# Patient Record
Sex: FEMALE | Race: WHITE | HISPANIC OR LATINO | Employment: UNEMPLOYED | URBAN - METROPOLITAN AREA
[De-identification: names, ages, dates, MRNs, and addresses within clinical notes are randomized per-mention and may not be internally consistent; named-entity substitution may affect disease eponyms.]

---

## 2017-03-30 ENCOUNTER — HOSPITAL ENCOUNTER (EMERGENCY)
Facility: HOSPITAL | Age: 42
Discharge: HOME | End: 2017-03-30
Attending: EMERGENCY MEDICINE | Admitting: EMERGENCY MEDICINE

## 2017-03-30 VITALS
WEIGHT: 175 LBS | TEMPERATURE: 97.3 F | DIASTOLIC BLOOD PRESSURE: 76 MMHG | RESPIRATION RATE: 16 BRPM | OXYGEN SATURATION: 100 % | HEART RATE: 75 BPM | SYSTOLIC BLOOD PRESSURE: 120 MMHG

## 2017-03-30 DIAGNOSIS — G44.209 HEADACHE, TENSION-TYPE: Primary | ICD-10-CM

## 2017-03-30 DIAGNOSIS — F43.9 STRESS: ICD-10-CM

## 2017-03-30 DIAGNOSIS — R07.89 ANTERIOR CHEST WALL PAIN: ICD-10-CM

## 2017-03-30 LAB — HCG UR QL: NEGATIVE

## 2017-03-30 PROCEDURE — 96375 TX/PRO/DX INJ NEW DRUG ADDON: CPT

## 2017-03-30 PROCEDURE — 93005 ELECTROCARDIOGRAM TRACING: CPT

## 2017-03-30 PROCEDURE — 96374 THER/PROPH/DIAG INJ IV PUSH: CPT

## 2017-03-30 PROCEDURE — 96361 HYDRATE IV INFUSION ADD-ON: CPT

## 2017-03-30 PROCEDURE — 99285 EMERGENCY DEPT VISIT HI MDM: CPT

## 2017-03-30 PROCEDURE — 81025 URINE PREGNANCY TEST: CPT | Performed by: EMERGENCY MEDICINE

## 2017-03-30 RX ORDER — LIDOCAINE 50 MG/G
1 PATCH TOPICAL ONCE
Status: DISCONTINUED | OUTPATIENT
Start: 2017-03-30 | End: 2017-03-31 | Stop reason: HOSPADM

## 2017-03-30 RX ORDER — NAPROXEN 375 MG/1
375 TABLET ORAL EVERY 8 HOURS PRN
Qty: 30 TABLET | Refills: 0 | Status: SHIPPED | OUTPATIENT
Start: 2017-03-30 | End: 2018-04-09

## 2017-03-30 RX ORDER — KETOROLAC TROMETHAMINE 30 MG/ML
15 INJECTION, SOLUTION INTRAMUSCULAR; INTRAVENOUS ONCE
Status: COMPLETED | OUTPATIENT
Start: 2017-03-30 | End: 2017-03-30

## 2017-03-30 RX ORDER — METOCLOPRAMIDE HYDROCHLORIDE 5 MG/ML
10 INJECTION INTRAMUSCULAR; INTRAVENOUS ONCE
Status: COMPLETED | OUTPATIENT
Start: 2017-03-30 | End: 2017-03-30

## 2017-03-30 RX ORDER — DIPHENHYDRAMINE HYDROCHLORIDE 50 MG/ML
25 INJECTION INTRAMUSCULAR; INTRAVENOUS ONCE
Status: COMPLETED | OUTPATIENT
Start: 2017-03-30 | End: 2017-03-30

## 2017-03-30 RX ORDER — BUTALBITAL, ACETAMINOPHEN AND CAFFEINE 50; 325; 40 MG/1; MG/1; MG/1
1 CAPSULE ORAL EVERY 6 HOURS PRN
Qty: 30 CAPSULE | Refills: 0 | Status: SHIPPED | OUTPATIENT
Start: 2017-03-30 | End: 2017-04-09

## 2017-03-30 RX ADMIN — LIDOCAINE 1 PATCH: 50 PATCH CUTANEOUS at 21:21

## 2017-03-30 RX ADMIN — DIPHENHYDRAMINE HYDROCHLORIDE 25 MG: 50 INJECTION, SOLUTION INTRAMUSCULAR; INTRAVENOUS at 21:22

## 2017-03-30 RX ADMIN — KETOROLAC TROMETHAMINE 15 MG: 30 INJECTION, SOLUTION INTRAMUSCULAR at 21:22

## 2017-03-30 RX ADMIN — METOCLOPRAMIDE 10 MG: 5 INJECTION, SOLUTION INTRAMUSCULAR; INTRAVENOUS at 21:22

## 2017-03-30 RX ADMIN — SODIUM CHLORIDE 1000 ML: 0.9 INJECTION, SOLUTION INTRAVENOUS at 21:21

## 2017-04-01 LAB
ATRIAL RATE: 67 BPM
P AXIS: 59 DEGREES
PR INTERVAL: 188 MS
QRS AXIS: 58 DEGREES
QRSD INTERVAL: 80 MS
QT INTERVAL: 378 MS
QTC INTERVAL: 399 MS
T WAVE AXIS: 55 DEGREES
VENTRICULAR RATE: 67 BPM

## 2017-10-18 ENCOUNTER — ALLSCRIPTS OFFICE VISIT (OUTPATIENT)
Dept: OTHER | Facility: OTHER | Age: 42
End: 2017-10-18

## 2017-10-19 NOTE — PROGRESS NOTES
Assessment  1  History of acute sinusitis (V12 69) (Z87 09)   2  Acute sinusitis (461 9) (J01 90)    Plan  Acute sinusitis    · Doxycycline Hyclate 100 MG Oral Capsule; TAKE 1 CAPSULE TWICE DAILY   · PredniSONE 10 MG Oral Tablet; TAKE 1 TABLET TWICE DAILY    Take medication as directed  Call if symptoms do not improve  Chief Complaint  1  Cold Symptoms  sinus pressure,headache x 4 days  taking tylenol      History of Present Illness  HPI: Patient is a 40-year-old female complaining of sinus pressure, headache, fever, chills, productive cough for almost a week  Symptoms are getting much worse over the last 2 days  She tried over-the-counter medication without relief  Cold Symptoms:   Associated symptoms include nasal congestion,-- runny nose,-- post nasal drainage,-- productive cough,-- facial pressure,-- facial pain,-- headache,-- fatigue,-- weakness,-- fever-- and-- chills, but-- no plugged ear(s),-- no ear pain,-- no swollen lymph nodes,-- no wheezing,-- no nausea,-- no vomiting-- and-- no diarrhea  Review of Systems    Constitutional: feeling poorly-- and-- feeling tired  ENT: as noted in HPI  Cardiovascular: no complaints of slow or fast heart rate, no chest pain, no palpitations, no leg claudication or lower extremity edema  Respiratory: as noted in HPI,-- no orthopnea,-- no shortness of breath during exertion-- and-- no PND  Genitourinary: no complaints of dysuria, no incontinence, no pelvic pain, no dysmenorrhea, no vaginal discharge or abnormal vaginal bleeding  Musculoskeletal: no arthralgias-- and-- no myalgias  Neurological: as noted in HPI  Active Problems  1  Carotid artery aneurysm (442 81) (I72 0)   2  Low back pain (724 2) (M54 5)    Past Medical History  1  History of Acute cystitis with hematuria (595 0) (N30 01)   2  History of Acute reaction to stress (308 9) (F43 0)   3  History of Atypical chest pain (786 59) (R07 89)   4  History of sciatica (V12 49) (Z86 69)   5  History of Impacted cerumen of left ear (380 4) (H61 22)   6  History of Insomnia due to stress (307 41) (F51 02)   7  History of Neck pain (723 1) (M54 2)   8  History of Somatic dysfunction of cervical region (739 1) (M99 01)    Family History  Mother    1  No pertinent family history    Social History   · Never A Smoker  The social history was reviewed and updated today  The social history was reviewed and is unchanged  Current Meds   1  Eszopiclone 1 MG Oral Tablet; take 1 tablet by mouth at bedtime for 5 NIGHTS, THEN   AS NEEDED; Therapy: [id number] to (Last Rx:[id number]) Ordered    The medication list was reviewed and updated today  Allergies  1  Amoxicillin CAPS    Vitals   Recorded: [id number] 02:39PM   Temperature 59 5 F   Systolic 583   Diastolic 70   Height 5 ft 8 in   Weight 172 lb    BMI Calculated 26 15   BSA Calculated 1 92     Physical Exam    Constitutional   General appearance: No acute distress, well appearing and well nourished  Eyes   Conjunctiva and lids: No swelling, erythema or discharge  Pupils and irises: Equal, round and reactive to light  Ears, Nose, Mouth, and Throat   External inspection of ears and nose: Normal     Otoscopic examination: Tympanic membranes translucent with normal light reflex  Canals patent without erythema  Nasal mucosa, septum, and turbinates: Abnormal   There was a mucoid discharge from both nares  The bilateral nasal mucosa was crusted-- and-- edematous  -- Right greater than left maxillary tenderness  Oropharynx: Normal with no erythema, edema, exudate or lesions  Pulmonary   Respiratory effort: No increased work of breathing or signs of respiratory distress  Auscultation of lungs: Clear to auscultation  Cardiovascular   Palpation of heart: Normal PMI, no thrills  Auscultation of heart: Normal rate and rhythm, normal S1 and S2, without murmurs      Examination of extremities for edema and/or varicosities: Normal     Carotid pulses: Normal     Abdomen   Abdomen: Non-tender, no masses  Liver and spleen: No hepatomegaly or splenomegaly  Lymphatic   Palpation of lymph nodes in neck: No lymphadenopathy  Musculoskeletal   Gait and station: Normal     Digits and nails: Normal without clubbing or cyanosis  Inspection/palpation of joints, bones, and muscles: Normal     Skin   Skin and subcutaneous tissue: Normal without rashes or lesions      Psychiatric   Orientation to person, place, and time: Normal     Mood and affect: Normal          Signatures   Electronically signed by : Janell Cuevas DO; Oct 18 2017  4:07PM EST                       (Author)

## 2018-01-12 VITALS
SYSTOLIC BLOOD PRESSURE: 110 MMHG | TEMPERATURE: 98.6 F | HEIGHT: 68 IN | BODY MASS INDEX: 26.07 KG/M2 | WEIGHT: 172 LBS | DIASTOLIC BLOOD PRESSURE: 70 MMHG

## 2018-04-09 ENCOUNTER — HOSPITAL ENCOUNTER (EMERGENCY)
Facility: HOSPITAL | Age: 43
Discharge: HOME | End: 2018-04-09
Attending: EMERGENCY MEDICINE | Admitting: EMERGENCY MEDICINE

## 2018-04-09 VITALS
RESPIRATION RATE: 16 BRPM | WEIGHT: 171 LBS | BODY MASS INDEX: 26 KG/M2 | DIASTOLIC BLOOD PRESSURE: 69 MMHG | SYSTOLIC BLOOD PRESSURE: 127 MMHG | TEMPERATURE: 97.8 F | HEART RATE: 74 BPM | OXYGEN SATURATION: 98 %

## 2018-04-09 DIAGNOSIS — R31.9 HEMATURIA: ICD-10-CM

## 2018-04-09 DIAGNOSIS — N30.00 ACUTE CYSTITIS: Primary | ICD-10-CM

## 2018-04-09 LAB
BACTERIA UR QL AUTO: ABNORMAL /HPF
BILIRUB UR QL STRIP: NEGATIVE
CLARITY UR: ABNORMAL
COLOR UR: YELLOW
EXT PREG TEST URINE: NEGATIVE
GLUCOSE UR STRIP-MCNC: NEGATIVE MG/DL
HGB UR QL STRIP.AUTO: ABNORMAL
KETONES UR STRIP-MCNC: NEGATIVE MG/DL
LEUKOCYTE ESTERASE UR QL STRIP: ABNORMAL
NITRITE UR QL STRIP: NEGATIVE
NON-SQ EPI CELLS URNS QL MICRO: ABNORMAL /HPF
PH UR STRIP.AUTO: 6.5 [PH] (ref 4.5–8)
PROT UR STRIP-MCNC: ABNORMAL MG/DL
RBC #/AREA URNS AUTO: ABNORMAL /HPF
SP GR UR STRIP.AUTO: 1.02 (ref 1–1.03)
UROBILINOGEN UR QL STRIP.AUTO: 0.2 E.U./DL
WBC #/AREA URNS AUTO: ABNORMAL /HPF

## 2018-04-09 PROCEDURE — 99283 EMERGENCY DEPT VISIT LOW MDM: CPT

## 2018-04-09 PROCEDURE — 81001 URINALYSIS AUTO W/SCOPE: CPT

## 2018-04-09 PROCEDURE — 87086 URINE CULTURE/COLONY COUNT: CPT

## 2018-04-09 PROCEDURE — 87186 SC STD MICRODIL/AGAR DIL: CPT

## 2018-04-09 PROCEDURE — 87077 CULTURE AEROBIC IDENTIFY: CPT

## 2018-04-09 PROCEDURE — 81025 URINE PREGNANCY TEST: CPT | Performed by: EMERGENCY MEDICINE

## 2018-04-09 PROCEDURE — 81002 URINALYSIS NONAUTO W/O SCOPE: CPT | Performed by: EMERGENCY MEDICINE

## 2018-04-09 RX ORDER — CEPHALEXIN 250 MG/1
500 CAPSULE ORAL ONCE
Status: COMPLETED | OUTPATIENT
Start: 2018-04-09 | End: 2018-04-09

## 2018-04-09 RX ORDER — CEPHALEXIN 500 MG/1
500 CAPSULE ORAL 2 TIMES DAILY
Qty: 6 CAPSULE | Refills: 0 | Status: SHIPPED | OUTPATIENT
Start: 2018-04-09 | End: 2018-04-12

## 2018-04-09 RX ORDER — IBUPROFEN 600 MG/1
600 TABLET ORAL ONCE
Status: COMPLETED | OUTPATIENT
Start: 2018-04-09 | End: 2018-04-09

## 2018-04-09 RX ORDER — PHENAZOPYRIDINE HYDROCHLORIDE 100 MG/1
100 TABLET, FILM COATED ORAL ONCE
Status: COMPLETED | OUTPATIENT
Start: 2018-04-09 | End: 2018-04-09

## 2018-04-09 RX ORDER — IBUPROFEN 600 MG/1
600 TABLET ORAL EVERY 6 HOURS PRN
Qty: 30 TABLET | Refills: 0 | Status: SHIPPED | OUTPATIENT
Start: 2018-04-09 | End: 2019-08-19

## 2018-04-09 RX ORDER — PHENAZOPYRIDINE HYDROCHLORIDE 200 MG/1
200 TABLET, FILM COATED ORAL 3 TIMES DAILY
Qty: 5 TABLET | Refills: 0 | Status: SHIPPED | OUTPATIENT
Start: 2018-04-09 | End: 2019-03-30 | Stop reason: ALTCHOICE

## 2018-04-09 RX ADMIN — CEPHALEXIN 500 MG: 250 CAPSULE ORAL at 10:37

## 2018-04-09 RX ADMIN — IBUPROFEN 600 MG: 600 TABLET ORAL at 10:04

## 2018-04-09 RX ADMIN — PHENAZOPYRIDINE HYDROCHLORIDE 100 MG: 100 TABLET ORAL at 10:04

## 2018-04-09 NOTE — DISCHARGE INSTRUCTIONS
Urinary Tract Infection in Women   WHAT YOU NEED TO KNOW:   A urinary tract infection (UTI) is caused by bacteria that get inside your urinary tract  Most bacteria that enter your urinary tract come out when you urinate  If the bacteria stay in your urinary tract, you may get an infection  Your urinary tract includes your kidneys, ureters, bladder, and urethra  Urine is made in your kidneys, and it flows from the ureters to the bladder  Urine leaves the bladder through the urethra  A UTI is more common in your lower urinary tract, which includes your bladder and urethra  DISCHARGE INSTRUCTIONS:   Return to the emergency department if:   · You are urinating very little or not at all  · You have a high fever with shaking chills  · You have side or back pain that gets worse  Contact your healthcare provider if:   · You have a fever  · You do not feel better after 2 days of taking antibiotics  · You are vomiting  · You have questions or concerns about your condition or care  Prevent another UTI:   · Empty your bladder often  Urinate and empty your bladder as soon as you feel the need  Do not hold your urine for long periods of time  · Wipe from front to back after you urinate or have a bowel movement  This will help prevent germs from getting into your urinary tract through your urethra  · Drink liquids as directed  Ask how much liquid to drink each day and which liquids are best for you  You may need to drink more liquids than usual to help flush out the bacteria  Do not drink alcohol, caffeine, or citrus juices  These can irritate your bladder and increase your symptoms  Your healthcare provider may recommend cranberry juice to help prevent a UTI  · Urinate after you have sex  This can help flush out bacteria passed during sex  · Do not douche or use feminine deodorants  These can change the chemical balance in your vagina  · Change sanitary pads or tampons often  This will help prevent germs from getting into your urinary tract

## 2018-04-11 LAB
BACTERIA UR CULT: ABNORMAL
BACTERIA UR CULT: ABNORMAL

## 2019-03-30 ENCOUNTER — APPOINTMENT (EMERGENCY)
Dept: RADIOLOGY | Facility: HOSPITAL | Age: 44
End: 2019-03-30

## 2019-03-30 ENCOUNTER — APPOINTMENT (EMERGENCY)
Dept: CT IMAGING | Facility: HOSPITAL | Age: 44
End: 2019-03-30

## 2019-03-30 ENCOUNTER — HOSPITAL ENCOUNTER (EMERGENCY)
Facility: HOSPITAL | Age: 44
Discharge: HOME | End: 2019-03-30
Attending: EMERGENCY MEDICINE

## 2019-03-30 VITALS
SYSTOLIC BLOOD PRESSURE: 123 MMHG | DIASTOLIC BLOOD PRESSURE: 71 MMHG | TEMPERATURE: 98.1 F | BODY MASS INDEX: 28.16 KG/M2 | OXYGEN SATURATION: 98 % | WEIGHT: 185.19 LBS | RESPIRATION RATE: 20 BRPM | HEART RATE: 75 BPM

## 2019-03-30 DIAGNOSIS — R07.89 ATYPICAL CHEST PAIN: ICD-10-CM

## 2019-03-30 DIAGNOSIS — R51.9 HEADACHE: Primary | ICD-10-CM

## 2019-03-30 DIAGNOSIS — B34.9 VIRAL ILLNESS: ICD-10-CM

## 2019-03-30 LAB
ALBUMIN SERPL BCP-MCNC: 3.5 G/DL (ref 3.5–5)
ALP SERPL-CCNC: 91 U/L (ref 46–116)
ALT SERPL W P-5'-P-CCNC: 24 U/L (ref 12–78)
ANION GAP SERPL CALCULATED.3IONS-SCNC: 8 MMOL/L (ref 4–13)
APTT PPP: 25 SECONDS (ref 26–38)
AST SERPL W P-5'-P-CCNC: 18 U/L (ref 5–45)
BASOPHILS # BLD AUTO: 0.04 THOUSANDS/ΜL (ref 0–0.1)
BASOPHILS NFR BLD AUTO: 1 % (ref 0–1)
BILIRUB SERPL-MCNC: 0.24 MG/DL (ref 0.2–1)
BILIRUB UR QL STRIP: NEGATIVE
BUN SERPL-MCNC: 12 MG/DL (ref 5–25)
CALCIUM SERPL-MCNC: 8.7 MG/DL (ref 8.3–10.1)
CHLORIDE SERPL-SCNC: 105 MMOL/L (ref 100–108)
CLARITY UR: CLEAR
CO2 SERPL-SCNC: 29 MMOL/L (ref 21–32)
COLOR UR: YELLOW
CREAT SERPL-MCNC: 0.72 MG/DL (ref 0.6–1.3)
EOSINOPHIL # BLD AUTO: 0.15 THOUSAND/ΜL (ref 0–0.61)
EOSINOPHIL NFR BLD AUTO: 3 % (ref 0–6)
ERYTHROCYTE [DISTWIDTH] IN BLOOD BY AUTOMATED COUNT: 12.6 % (ref 11.6–15.1)
EXT PREG TEST URINE: NEGATIVE
GFR SERPL CREATININE-BSD FRML MDRD: 101 ML/MIN/1.73SQ M
GLUCOSE SERPL-MCNC: 97 MG/DL (ref 65–140)
GLUCOSE UR STRIP-MCNC: NEGATIVE MG/DL
HCT VFR BLD AUTO: 39.6 % (ref 34.8–46.1)
HGB BLD-MCNC: 12.7 G/DL (ref 11.5–15.4)
HGB UR QL STRIP.AUTO: NEGATIVE
IMM GRANULOCYTES # BLD AUTO: 0.02 THOUSAND/UL (ref 0–0.2)
IMM GRANULOCYTES NFR BLD AUTO: 0 % (ref 0–2)
INR PPP: 0.97 (ref 0.86–1.17)
KETONES UR STRIP-MCNC: ABNORMAL MG/DL
LEUKOCYTE ESTERASE UR QL STRIP: NEGATIVE
LYMPHOCYTES # BLD AUTO: 1.41 THOUSANDS/ΜL (ref 0.6–4.47)
LYMPHOCYTES NFR BLD AUTO: 28 % (ref 14–44)
MAGNESIUM SERPL-MCNC: 1.7 MG/DL (ref 1.6–2.6)
MCH RBC QN AUTO: 27.8 PG (ref 26.8–34.3)
MCHC RBC AUTO-ENTMCNC: 32.1 G/DL (ref 31.4–37.4)
MCV RBC AUTO: 87 FL (ref 82–98)
MONOCYTES # BLD AUTO: 0.38 THOUSAND/ΜL (ref 0.17–1.22)
MONOCYTES NFR BLD AUTO: 8 % (ref 4–12)
NEUTROPHILS # BLD AUTO: 3.01 THOUSANDS/ΜL (ref 1.85–7.62)
NEUTS SEG NFR BLD AUTO: 60 % (ref 43–75)
NITRITE UR QL STRIP: NEGATIVE
NRBC BLD AUTO-RTO: 0 /100 WBCS
PH UR STRIP.AUTO: 7 [PH] (ref 4.5–8)
PLATELET # BLD AUTO: 203 THOUSANDS/UL (ref 149–390)
PMV BLD AUTO: 11.4 FL (ref 8.9–12.7)
POTASSIUM SERPL-SCNC: 3.4 MMOL/L (ref 3.5–5.3)
PROT SERPL-MCNC: 7 G/DL (ref 6.4–8.2)
PROT UR STRIP-MCNC: NEGATIVE MG/DL
PROTHROMBIN TIME: 13 SECONDS (ref 11.8–14.2)
RBC # BLD AUTO: 4.57 MILLION/UL (ref 3.81–5.12)
SODIUM SERPL-SCNC: 142 MMOL/L (ref 136–145)
SP GR UR STRIP.AUTO: 1.02 (ref 1–1.03)
TROPONIN I SERPL-MCNC: <0.02 NG/ML
UROBILINOGEN UR QL STRIP.AUTO: 1 E.U./DL
WBC # BLD AUTO: 5.01 THOUSAND/UL (ref 4.31–10.16)

## 2019-03-30 PROCEDURE — 71046 X-RAY EXAM CHEST 2 VIEWS: CPT

## 2019-03-30 PROCEDURE — 85025 COMPLETE CBC W/AUTO DIFF WBC: CPT | Performed by: NURSE PRACTITIONER

## 2019-03-30 PROCEDURE — 96375 TX/PRO/DX INJ NEW DRUG ADDON: CPT

## 2019-03-30 PROCEDURE — 93005 ELECTROCARDIOGRAM TRACING: CPT

## 2019-03-30 PROCEDURE — 81003 URINALYSIS AUTO W/O SCOPE: CPT

## 2019-03-30 PROCEDURE — 83735 ASSAY OF MAGNESIUM: CPT | Performed by: NURSE PRACTITIONER

## 2019-03-30 PROCEDURE — 36415 COLL VENOUS BLD VENIPUNCTURE: CPT | Performed by: NURSE PRACTITIONER

## 2019-03-30 PROCEDURE — 99284 EMERGENCY DEPT VISIT MOD MDM: CPT

## 2019-03-30 PROCEDURE — 85730 THROMBOPLASTIN TIME PARTIAL: CPT | Performed by: NURSE PRACTITIONER

## 2019-03-30 PROCEDURE — 70450 CT HEAD/BRAIN W/O DYE: CPT

## 2019-03-30 PROCEDURE — 81025 URINE PREGNANCY TEST: CPT

## 2019-03-30 PROCEDURE — 96361 HYDRATE IV INFUSION ADD-ON: CPT

## 2019-03-30 PROCEDURE — 96365 THER/PROPH/DIAG IV INF INIT: CPT

## 2019-03-30 PROCEDURE — 80053 COMPREHEN METABOLIC PANEL: CPT | Performed by: NURSE PRACTITIONER

## 2019-03-30 PROCEDURE — 85610 PROTHROMBIN TIME: CPT | Performed by: NURSE PRACTITIONER

## 2019-03-30 PROCEDURE — 84484 ASSAY OF TROPONIN QUANT: CPT | Performed by: NURSE PRACTITIONER

## 2019-03-30 RX ORDER — MAGNESIUM SULFATE HEPTAHYDRATE 40 MG/ML
2 INJECTION, SOLUTION INTRAVENOUS ONCE
Status: COMPLETED | OUTPATIENT
Start: 2019-03-30 | End: 2019-03-30

## 2019-03-30 RX ORDER — METHYLPREDNISOLONE SODIUM SUCCINATE 125 MG/2ML
80 INJECTION, POWDER, LYOPHILIZED, FOR SOLUTION INTRAMUSCULAR; INTRAVENOUS ONCE
Status: COMPLETED | OUTPATIENT
Start: 2019-03-30 | End: 2019-03-30

## 2019-03-30 RX ORDER — METOCLOPRAMIDE HYDROCHLORIDE 5 MG/ML
10 INJECTION INTRAMUSCULAR; INTRAVENOUS ONCE
Status: COMPLETED | OUTPATIENT
Start: 2019-03-30 | End: 2019-03-30

## 2019-03-30 RX ORDER — KETOROLAC TROMETHAMINE 30 MG/ML
30 INJECTION, SOLUTION INTRAMUSCULAR; INTRAVENOUS ONCE
Status: COMPLETED | OUTPATIENT
Start: 2019-03-30 | End: 2019-03-30

## 2019-03-30 RX ORDER — DIPHENHYDRAMINE HYDROCHLORIDE 50 MG/ML
25 INJECTION INTRAMUSCULAR; INTRAVENOUS ONCE
Status: COMPLETED | OUTPATIENT
Start: 2019-03-30 | End: 2019-03-30

## 2019-03-30 RX ADMIN — METOCLOPRAMIDE 10 MG: 5 INJECTION, SOLUTION INTRAMUSCULAR; INTRAVENOUS at 20:42

## 2019-03-30 RX ADMIN — KETOROLAC TROMETHAMINE 30 MG: 30 INJECTION, SOLUTION INTRAMUSCULAR; INTRAVENOUS at 20:38

## 2019-03-30 RX ADMIN — METHYLPREDNISOLONE SODIUM SUCCINATE 80 MG: 125 INJECTION, POWDER, FOR SOLUTION INTRAMUSCULAR; INTRAVENOUS at 20:41

## 2019-03-30 RX ADMIN — MAGNESIUM SULFATE HEPTAHYDRATE 2 G: 40 INJECTION, SOLUTION INTRAVENOUS at 21:16

## 2019-03-30 RX ADMIN — SODIUM CHLORIDE 1000 ML: 0.9 INJECTION, SOLUTION INTRAVENOUS at 20:37

## 2019-03-30 RX ADMIN — DIPHENHYDRAMINE HYDROCHLORIDE 25 MG: 50 INJECTION, SOLUTION INTRAMUSCULAR; INTRAVENOUS at 20:39

## 2019-04-01 LAB
ATRIAL RATE: 72 BPM
P AXIS: 106 DEGREES
PR INTERVAL: 196 MS
QRS AXIS: 65 DEGREES
QRSD INTERVAL: 70 MS
QT INTERVAL: 378 MS
QTC INTERVAL: 413 MS
T WAVE AXIS: 63 DEGREES
VENTRICULAR RATE: 72 BPM

## 2019-04-01 PROCEDURE — 93010 ELECTROCARDIOGRAM REPORT: CPT | Performed by: INTERNAL MEDICINE

## 2019-04-02 ENCOUNTER — OFFICE VISIT (OUTPATIENT)
Dept: FAMILY MEDICINE CLINIC | Facility: CLINIC | Age: 44
End: 2019-04-02

## 2019-04-02 VITALS
SYSTOLIC BLOOD PRESSURE: 120 MMHG | WEIGHT: 186 LBS | HEIGHT: 67 IN | DIASTOLIC BLOOD PRESSURE: 78 MMHG | BODY MASS INDEX: 29.19 KG/M2

## 2019-04-02 DIAGNOSIS — G43.009 MIGRAINE WITHOUT AURA AND WITHOUT STATUS MIGRAINOSUS, NOT INTRACTABLE: Primary | ICD-10-CM

## 2019-04-02 DIAGNOSIS — Z23 NEED FOR VACCINATION: ICD-10-CM

## 2019-04-02 DIAGNOSIS — M62.838 CERVICAL PARASPINAL MUSCLE SPASM: ICD-10-CM

## 2019-04-02 PROCEDURE — 99213 OFFICE O/P EST LOW 20 MIN: CPT | Performed by: FAMILY MEDICINE

## 2019-04-02 RX ORDER — TIZANIDINE HYDROCHLORIDE 4 MG/1
CAPSULE, GELATIN COATED ORAL
Qty: 10 CAPSULE | Refills: 0 | Status: SHIPPED | OUTPATIENT
Start: 2019-04-02 | End: 2019-08-19

## 2019-04-02 RX ORDER — BUTALBITAL, ACETAMINOPHEN AND CAFFEINE 50; 325; 40 MG/1; MG/1; MG/1
1 TABLET ORAL EVERY 4 HOURS PRN
Qty: 30 TABLET | Refills: 0 | Status: SHIPPED | OUTPATIENT
Start: 2019-04-02 | End: 2019-08-19

## 2019-08-19 ENCOUNTER — OFFICE VISIT (OUTPATIENT)
Dept: FAMILY MEDICINE CLINIC | Facility: CLINIC | Age: 44
End: 2019-08-19

## 2019-08-19 VITALS
TEMPERATURE: 98.6 F | DIASTOLIC BLOOD PRESSURE: 82 MMHG | WEIGHT: 186 LBS | SYSTOLIC BLOOD PRESSURE: 120 MMHG | HEIGHT: 67 IN | BODY MASS INDEX: 29.19 KG/M2

## 2019-08-19 DIAGNOSIS — H61.22 IMPACTED CERUMEN OF LEFT EAR: ICD-10-CM

## 2019-08-19 DIAGNOSIS — H60.392 ACUTE INFECTIVE OTITIS EXTERNA, LEFT: Primary | ICD-10-CM

## 2019-08-19 PROBLEM — M62.838 CERVICAL PARASPINAL MUSCLE SPASM: Status: RESOLVED | Noted: 2019-04-02 | Resolved: 2019-08-19

## 2019-08-19 PROCEDURE — 99213 OFFICE O/P EST LOW 20 MIN: CPT | Performed by: FAMILY MEDICINE

## 2019-08-19 NOTE — PROGRESS NOTES
Assessment/Plan:         Diagnoses and all orders for this visit:    Acute infective otitis externa, left  Comments:   call if symptoms do not improve  Orders:  -     neomycin-polymyxin-hydrocortisone (CORTISPORIN) otic solution; Administer 4 drops into the left ear every 6 (six) hours    Impacted cerumen of left ear  Comments:    Cerumen removed without difficulty  May use hydrogen peroxide once a month were event further difficulties  Return to the office if symptoms return  Subjective:   Chief Complaint   Patient presents with    Earache      x 1 wk   Neck Pain      x  2 days           Patient ID: Lucía John is a 53 y o  female  Patient is a 56-year-old female complaining of left ear pain for a week  It got much worse over the last 48 hours  She has hearing loss in that ear  She has had trouble with wax in that ear in the past  She denies any cold symptoms  She has been swimming quite a bit this summer  The following portions of the patient's history were reviewed and updated as appropriate: allergies, current medications, past family history, past medical history, past social history, past surgical history and problem list     Review of Systems   Constitutional: Negative for activity change, chills, diaphoresis, fatigue, fever and unexpected weight change  HENT: Positive for ear pain and hearing loss  Negative for congestion, nosebleeds, postnasal drip, rhinorrhea, sinus pressure, sore throat and tinnitus  Eyes: Negative for photophobia, pain, discharge, redness and visual disturbance  Respiratory: Negative for cough, chest tightness, shortness of breath and wheezing  Cardiovascular: Negative for chest pain, palpitations and leg swelling  Denies GONGORA   Gastrointestinal: Negative for abdominal pain, blood in stool, constipation, diarrhea, nausea and vomiting  Endocrine: Negative      Genitourinary: Negative for difficulty urinating, dysuria, frequency, hematuria and urgency  Denies dysmenorrhea, menstrual difficulties   Musculoskeletal: Negative for arthralgias, joint swelling and myalgias  Skin: Negative for rash and wound  Denies skin lesions, change in birthmarks   Allergic/Immunologic: Negative for environmental allergies, food allergies and immunocompromised state  Neurological: Negative for dizziness, tremors, seizures, syncope, weakness, numbness and headaches  Hematological: Negative  Psychiatric/Behavioral: Negative for behavioral problems, confusion, decreased concentration and sleep disturbance  The patient is not nervous/anxious  Objective:      /82   Temp 98 6 °F (37 °C)   Ht 5' 7" (1 702 m)   Wt 84 4 kg (186 lb)   BMI 29 13 kg/m²          Physical Exam   Constitutional: She is oriented to person, place, and time  She appears well-developed and well-nourished  No distress  HENT:   Head: Normocephalic and atraumatic  Right Ear: External ear normal    Left Ear: External ear normal  Left ear middle ear effusion:     Nose: Nose normal    Mouth/Throat: Oropharynx is clear and moist      Cerumen impaction OS  Once the wax was removed there was inflammation of the canal with some purulent material    Eyes: Pupils are equal, round, and reactive to light  Conjunctivae and EOM are normal    Neck: Normal range of motion  Neck supple  No JVD present  No tracheal deviation present  No thyromegaly present  Cardiovascular: Normal rate, regular rhythm and normal heart sounds  No murmur heard  Pulmonary/Chest: Effort normal and breath sounds normal  She has no wheezes  She has no rales  Abdominal: Soft  Bowel sounds are normal  She exhibits no mass  There is no tenderness  There is no rebound and no guarding  Musculoskeletal: Normal range of motion  She exhibits no edema or tenderness  Lymphadenopathy:     She has no cervical adenopathy  Neurological: She is alert and oriented to person, place, and time  She has normal reflexes  No cranial nerve deficit  Skin: Skin is warm and dry  No lesion and no rash noted  Psychiatric: She has a normal mood and affect  Judgment normal        Procedure- soft brown wax was removed easily using a cerumen loop  It was mildly uncomfortable to the patient but this was done atraumatically there was no bleeding or swelling after the wax was removed

## 2019-09-12 ENCOUNTER — OFFICE VISIT (OUTPATIENT)
Dept: FAMILY MEDICINE CLINIC | Facility: CLINIC | Age: 44
End: 2019-09-12

## 2019-09-12 VITALS
BODY MASS INDEX: 29.66 KG/M2 | HEIGHT: 67 IN | DIASTOLIC BLOOD PRESSURE: 82 MMHG | WEIGHT: 189 LBS | SYSTOLIC BLOOD PRESSURE: 120 MMHG

## 2019-09-12 DIAGNOSIS — I72.0 ANEURYSM OF LEFT CAROTID ARTERY (HCC): ICD-10-CM

## 2019-09-12 DIAGNOSIS — M54.2 NECK PAIN ON LEFT SIDE: Primary | ICD-10-CM

## 2019-09-12 DIAGNOSIS — M54.12 CERVICAL RADICULOPATHY: ICD-10-CM

## 2019-09-12 PROCEDURE — 99214 OFFICE O/P EST MOD 30 MIN: CPT | Performed by: FAMILY MEDICINE

## 2019-09-12 RX ORDER — PREDNISONE 10 MG/1
TABLET ORAL
Qty: 30 TABLET | Refills: 0 | Status: SHIPPED | OUTPATIENT
Start: 2019-09-12 | End: 2019-10-08 | Stop reason: ALTCHOICE

## 2019-09-12 RX ORDER — BACLOFEN 10 MG/1
10 TABLET ORAL 3 TIMES DAILY
Qty: 30 TABLET | Refills: 0 | Status: SHIPPED | OUTPATIENT
Start: 2019-09-12 | End: 2019-10-08 | Stop reason: SDUPTHER

## 2019-09-12 NOTE — PROGRESS NOTES
Assessment/Plan:         Diagnoses and all orders for this visit:    Neck pain on left side  -     predniSONE 10 mg tablet; 5 x 2 days, 4 x 2 days, 3 x 2 days,2 x 2 days, 1 x 2 days  -     baclofen 10 mg tablet; Take 1 tablet (10 mg total) by mouth 3 (three) times a day  -     XR spine cervical complete 4 or 5 vw non injury; Future  -     Ambulatory Referral to Emergency Medicine; Future    Cervical radiculopathy  -     predniSONE 10 mg tablet; 5 x 2 days, 4 x 2 days, 3 x 2 days,2 x 2 days, 1 x 2 days  -     baclofen 10 mg tablet; Take 1 tablet (10 mg total) by mouth 3 (three) times a day  -     XR spine cervical complete 4 or 5 vw non injury; Future    Aneurysm of left carotid artery [facility])  Comments:   will refer to the emergency department  Orders:  -     Ambulatory Referral to Emergency Medicine; Future          Subjective:   Chief Complaint   Patient presents with    Neck Pain      x 4-5 days           Patient ID: Shraddha Case is a 53 y o  female  Patient is a 68-year-old female complaining of  Left-sided neck pain radiating into her left arm with numbness and weakness  Has had pain in her neck for a month and had a real bad migraine over the weekend  She denies prior similar episodes  She has had multiple mild traumatic episodes with her neck over her lifetime  She does have numbness and tingling going into her hand and down her arm  Of note, she had emergency room visit back in 2015, imaging at that time was suggestive of a possible left carotid aneurysm  It was recommended she see Neurosurgery  She did not follow up at that time  The following portions of the patient's history were reviewed and updated as appropriate: allergies, current medications, past family history, past medical history, past social history, past surgical history and problem list     Review of Systems   Constitutional: Negative for activity change, chills, diaphoresis, fatigue, fever and unexpected weight change  HENT: Negative for congestion, ear pain, hearing loss, nosebleeds, postnasal drip, rhinorrhea, sinus pressure, sore throat and tinnitus  Eyes: Negative for photophobia, pain, discharge, redness and visual disturbance  Respiratory: Negative for cough, chest tightness, shortness of breath and wheezing  Cardiovascular: Negative for chest pain, palpitations and leg swelling  Denies GONGORA   Gastrointestinal: Negative for abdominal pain, blood in stool, constipation, diarrhea, nausea and vomiting  Endocrine: Negative  Genitourinary: Negative for difficulty urinating, dysuria, frequency, hematuria and urgency  Denies dysmenorrhea, menstrual difficulties   Musculoskeletal: Positive for neck pain and neck stiffness  Negative for arthralgias, joint swelling and myalgias  Skin: Negative for rash and wound  Denies skin lesions, change in birthmarks   Allergic/Immunologic: Negative for environmental allergies, food allergies and immunocompromised state  Neurological: Positive for weakness and numbness  Negative for dizziness, tremors, seizures, syncope and headaches  Hematological: Negative  Psychiatric/Behavioral: Negative for behavioral problems, confusion, decreased concentration and sleep disturbance  The patient is not nervous/anxious  Objective:      /82   Ht 5' 7" (1 702 m)   Wt 85 7 kg (189 lb)   BMI 29 60 kg/m²          Physical Exam   Constitutional: She is oriented to person, place, and time  She appears well-developed and well-nourished  No distress  HENT:   Head: Normocephalic and atraumatic  Right Ear: External ear normal    Left Ear: External ear normal    Nose: Nose normal    Mouth/Throat: Oropharynx is clear and moist    Eyes: Pupils are equal, round, and reactive to light  Conjunctivae and EOM are normal    Neck: Normal range of motion  Neck supple  No JVD present  No tracheal deviation present  No thyromegaly present     Cardiovascular: Normal rate, regular rhythm and normal heart sounds  No murmur heard  Pulmonary/Chest: Effort normal and breath sounds normal  She has no wheezes  She has no rales  Abdominal: Soft  Bowel sounds are normal  She exhibits no mass  There is no tenderness  There is no rebound and no guarding  Musculoskeletal: She exhibits no edema  Cervical back: She exhibits decreased range of motion and tenderness (  Left lateral)  She exhibits no swelling  Lymphadenopathy:     She has no cervical adenopathy  Neurological: She is alert and oriented to person, place, and time  She has normal strength and normal reflexes  No cranial nerve deficit  Decreased sensation to light touch of the left hand, lateral forearm, and upper arm   Skin: Skin is warm and dry  No lesion and no rash noted  Psychiatric: She has a normal mood and affect   Judgment normal

## 2019-10-08 ENCOUNTER — OFFICE VISIT (OUTPATIENT)
Dept: FAMILY MEDICINE CLINIC | Facility: CLINIC | Age: 44
End: 2019-10-08

## 2019-10-08 VITALS
DIASTOLIC BLOOD PRESSURE: 78 MMHG | WEIGHT: 189 LBS | BODY MASS INDEX: 29.66 KG/M2 | HEIGHT: 67 IN | SYSTOLIC BLOOD PRESSURE: 118 MMHG

## 2019-10-08 DIAGNOSIS — M54.41 ACUTE MIDLINE LOW BACK PAIN WITH RIGHT-SIDED SCIATICA: Primary | ICD-10-CM

## 2019-10-08 DIAGNOSIS — R82.90 ABNORMAL URINE ODOR: ICD-10-CM

## 2019-10-08 DIAGNOSIS — M54.12 CERVICAL RADICULOPATHY: ICD-10-CM

## 2019-10-08 DIAGNOSIS — M54.2 NECK PAIN ON LEFT SIDE: ICD-10-CM

## 2019-10-08 LAB
SL AMB  POCT GLUCOSE, UA: NEGATIVE
SL AMB LEUKOCYTE ESTERASE,UA: ABNORMAL
SL AMB POCT BILIRUBIN,UA: NEGATIVE
SL AMB POCT BLOOD,UA: ABNORMAL
SL AMB POCT CLARITY,UA: CLEAR
SL AMB POCT COLOR,UA: YELLOW
SL AMB POCT KETONES,UA: NEGATIVE
SL AMB POCT NITRITE,UA: NEGATIVE
SL AMB POCT PH,UA: 6
SL AMB POCT SPECIFIC GRAVITY,UA: 1
SL AMB POCT URINE PROTEIN: NEGATIVE
SL AMB POCT UROBILINOGEN: 0.2

## 2019-10-08 PROCEDURE — 99213 OFFICE O/P EST LOW 20 MIN: CPT | Performed by: FAMILY MEDICINE

## 2019-10-08 PROCEDURE — 81002 URINALYSIS NONAUTO W/O SCOPE: CPT | Performed by: FAMILY MEDICINE

## 2019-10-08 RX ORDER — CIPROFLOXACIN 500 MG/1
500 TABLET, FILM COATED ORAL EVERY 12 HOURS SCHEDULED
Qty: 10 TABLET | Refills: 0 | Status: SHIPPED | OUTPATIENT
Start: 2019-10-08 | End: 2019-10-13

## 2019-10-08 RX ORDER — BACLOFEN 10 MG/1
TABLET ORAL
Qty: 30 TABLET | Refills: 0 | Status: SHIPPED | OUTPATIENT
Start: 2019-10-08 | End: 2020-04-16 | Stop reason: SDUPTHER

## 2019-10-08 RX ORDER — NAPROXEN 500 MG/1
TABLET ORAL
Qty: 30 TABLET | Refills: 0 | Status: SHIPPED | OUTPATIENT
Start: 2019-10-08 | End: 2020-02-25 | Stop reason: SDUPTHER

## 2019-10-08 NOTE — ASSESSMENT & PLAN NOTE
Back exercises were given Patient to thae the anti inflammatories and the muscle relaxant as directed

## 2019-10-08 NOTE — PATIENT INSTRUCTIONS
Patient appears to have 2 different issues She has low back pain that radiates to the right side and buttock She also has pelvic pain and odor to her urine Her urine dipstick shows there is small amount of blood and white blood cells which is indicative of a urinary tract infection I will treat that with 5 days of cipro I will send out her urine to confirm infection Patient will also make an appointment with her GYN Patient for the low back pain will take the naprosyn 500mg tiwce daily for 10 days then as needed with the muscle relaxant every 8 hours as needed She will also do the low back exercises     Core Strengthening Exercises   WHAT YOU NEED TO KNOW:   What do I need to know about core strengthening exercises? Core strengthening exercises help heal and strengthen muscles of your hips, back, and abdomen to prevent reinjury  They are beginning exercises to help support your spine  Ask your healthcare provider if you need to see a physical therapist for more advanced exercises  · Do the exercises on a mat or firm surface  (not on a bed) to support your spine and avoid low back pain  · Do the exercises in the same order every time  to train your muscles to work together  Your healthcare provider will show you how to perform these exercises  Do them every day, or as directed by your healthcare provider  · Move slowly and smoothly  Avoid fast or jerky motions  · Stop if you feel pain  It is normal to feel some discomfort at first  Regular exercise will help decrease your discomfort over time  How do I perform core strengthening exercises safely? Hold each exercise for 5 seconds  When you can do the exercise without pain for 5 seconds, increase your hold to 10 to 15 seconds  When you can do the exercise without pain for 10 to 15 seconds, add the next exercise  Increase the time you hold each exercise, or repeat the exercises as directed  As you do each exercise, breathe normally   Do not hold your breath  · Abdominal bracing:  Lie on your back with your knees bent and feet flat on the floor  Place your arms in a relaxed position beside your body  Pull your belly button in toward your spine  Do not flatten or arch your back  Tighten the abdominal muscles below your belly button  Hold for 5 seconds  Begin all of your exercises with abdominal bracing  You can also practice abdominal bracing throughout the day while you are sitting or standing  · Bridging:  Lie on your back with your knees bent and feet flat on the floor  Rest your arms at your side  Tighten your buttocks, and then lift your hips 1 inch off the floor  Hold for 5 seconds  When you can do this exercise without pain for 10 seconds, increase the distance you lift your hips  A good goal is to be able to lift your hips so that your shoulders, hips, and knees are in a straight line  · Curl up:  Lie on your back with your knees bent and feet flat on the floor  Place your hands, palms down, underneath the curve in your lower back  Next, with your elbows on the floor, lift your shoulders and chest 2 to 3 inches  Keep your head in line with your shoulders  Hold this position for 5 seconds  When you can do this exercise without pain for 10 to 15 seconds, you may add a rotation  While your shoulders and chest are lifted off the ground, turn slightly to the left and hold  Repeat on the other side  · Dead bug:  Lie on your back with your knees bent and feet flat on the floor  Place your arms in a relaxed position beside your body  Begin with abdominal bracing  Next, raise one leg, keeping your knee bent  Hold for 5 seconds  Repeat with the other leg  When you can do this exercise without pain for 10 to 15 seconds, you may raise one straight leg and hold  Repeat with the other leg  · Quadruped:  Place your hands and knees on the floor  Keep your wrists directly below your shoulders and your knees directly below your hips  Pull your belly button in toward your spine  Do not flatten or arch your back  Tighten your abdominal muscles below your belly button  Hold for 5 seconds  When you can do this exercise without pain for 10 to 15 seconds, you may extend one arm and hold  Repeat on the other side  · Side Bridge:      ¨ Standing side bridge:  Stand next to a wall and extend one arm toward the wall  Place your palm flat on the wall with your fingers pointing upward  Begin with abdominal bracing  Next, without moving your feet, slowly bend your arm to 90 degrees  Hold for 5 seconds  Repeat on the other side  When you can do this exercise without pain for 10 to 15 seconds, you may do the bent leg side bridge on the floor  ¨ Bent leg side bridge:  Lie on one side with your legs, hips, and shoulders in a straight line  Prop yourself up onto your forearm so your elbow is directly below your shoulder  Bend your knees back to 90 degrees  Begin with abdominal bracing  Next, lift your hips and balance yourself on your forearm and knees  Hold for 5 seconds  Repeat on the other side  When you can do this exercise without pain for 10 to 15 seconds, you may do the straight leg side bridge on the floor  ¨ Straight leg side bridge:  Lie on one side with your legs, hips, and shoulders in a straight line  Prop yourself up onto your forearm so your elbow is directly below your shoulder  Begin with abdominal bracing  Lift your hips off the floor and balance yourself on your forearm and the outside of your flexed foot  Do not let your ankle bend sideways  Hold for 5 seconds  Repeat on the other side  When you can do this exercise without pain for 10 to 15 seconds, ask your healthcare provider for more advanced exercises  When should I contact my healthcare provider? · Your pain becomes worse  · You have new pain      · You have questions or concerns about your condition, care, or exercise program   CARE AGREEMENT: You have the right to help plan your care  Learn about your health condition and how it may be treated  Discuss treatment options with your caregivers to decide what care you want to receive  You always have the right to refuse treatment  The above information is an  only  It is not intended as medical advice for individual conditions or treatments  Talk to your doctor, nurse or pharmacist before following any medical regimen to see if it is safe and effective for you  © 2016 [address] [employer] is for End User's use only and may not be sold, redistributed or otherwise used for commercial purposes  All illustrations and images included in CareNotes® are the copyrighted property of [employer]  or [employer]  Acute Low Back Pain   AMBULATORY CARE:   Acute low back pain  is sudden discomfort in your lower back area that lasts for up to 6 weeks  The discomfort makes it difficult to tolerate activity  Common symptoms include the following:   · Back stiffness or spasms    · Pain down the back or side of one leg    · Holding yourself in an unusual position or posture to decrease your back pain    · Not being able to find a sitting position that is comfortable    · Slow increase in your pain for 24 to 48 hours after you stress your back    · Tenderness on your lower back or severe pain when you move your back  Seek immediate care for the following symptoms:   · Severe pain    · Sudden stiffness and heaviness in both buttocks down to both legs    · Numbness or weakness in one leg, or pain in both legs    · Numbness in your genital area or across your lower back    · Unable to control your urine or bowel movements  Contact your healthcare provider if:   · You have a fever  · You have pain at night or when you rest     · Your pain does not get better with treatment  · You have pain that worsens when you cough or sneeze      · You suddenly feel something pop or snap in your back  · You have questions or concerns about your condition or care  The goal of treatment for acute low back pain  is to relieve your pain and help you tolerate activity  Most people with acute lower back pain get better within 4 to 6 weeks  You may need any of the following:  · Acetaminophen  decreases pain  It is available without a doctor's order  Ask how much to take and how often to take it  Follow directions  Acetaminophen can cause liver damage if not taken correctly  · NSAIDs  help decrease swelling and pain  This medicine is available with or without a doctor's order  NSAIDs can cause stomach bleeding or kidney problems in certain people  If you take blood thinner medicine, always ask your healthcare provider if NSAIDs are safe for you  Always read the medicine label and follow directions  · Prescription pain medicine  may be given  Ask your healthcare provider how to take this medicine safely  · Muscle relaxers  decrease pain by relaxing the muscles in your lower spine  Manage your symptoms:   · Stay active  as much as you can without causing more pain  Bed rest could make your back pain worse  Start with some light exercises such as walking  Avoid heavy lifting until your pain is gone  Ask for more information about the activities or exercises that are right for you  · Ice  helps decrease swelling, pain, and muscle spams  Put crushed ice in a plastic bag  Cover it with a towel  Place it on your lower back for 20 to 30 minutes every 2 hours  Do this for about 2 to 3 days after your pain starts, or as directed  · Heat  helps decrease pain and muscle spasms  Start to use heat after treatment with ice has stopped  Use a small towel dampened with warm water or a heating pad, or sit in a warm bath  Apply heat on the area for 20 to 30 minutes every 2 hours for as many days as directed  Alternate heat and ice  Prevent acute low back pain:   · Use proper body mechanics  ¨ Bend at the hips and knees when you  objects  Do not bend from the waist  Use your leg muscles as you lift the load  Do not use your back  Keep the object close to your chest as you lift it  Try not to twist or lift anything above your waist     ¨ Change your position often when you stand for long periods of time  Rest one foot on a small box or footrest, and then switch to the other foot often  ¨ Try not to sit for long periods of time  When you do, sit in a straight-backed chair with your feet flat on the floor  Never reach, pull, or push while you are sitting  · Do exercises that strengthen your back muscles  Warm up before you exercise  Ask your healthcare provider the best exercises for you  · Maintain a healthy weight  Ask your healthcare provider how much you should weigh  Ask him to help you create a weight loss plan if you are overweight  Follow up with your healthcare provider as directed:  Return for a follow-up visit if you still have pain after 1 to 3 weeks of treatment  You may need to visit an orthopedist if your back pain lasts more than 12 weeks  Write down your questions so you remember to ask them during your visits  © 2017 [address] [employer] Information is for End User's use only and may not be sold, redistributed or otherwise used for commercial purposes  All illustrations and images included in CareNotes® are the copyrighted property of [employer]  or [employer]ane [employer]  The above information is an  only  It is not intended as medical advice for individual conditions or treatments  Talk to your doctor, nurse or pharmacist before following any medical regimen to see if it is safe and effective for you

## 2019-10-08 NOTE — PROGRESS NOTES
Assessment/Plan:    Acute midline low back pain with right-sided sciatica  Back exercises were given Patient to thae the anti inflammatories and the muscle relaxant as directed     Abnormal urine odor  UA shows white cells and red blood cells with the odor thus will treat with the cipro as directed and patient will also see her GYN       Diagnoses and all orders for this visit:    Acute midline low back pain with right-sided sciatica  -     naproxen (NAPROSYN) 500 mg tablet; 1 tablet twice daily with food for 10 days then as needed for pain    Abnormal urine odor  -     ciprofloxacin (CIPRO) 500 mg tablet; Take 1 tablet (500 mg total) by mouth every 12 (twelve) hours for 5 days  -     Urine culture  -     POCT urine dip    Neck pain on left side  -     baclofen 10 mg tablet; 1 tablet every 8 hours as needed for lumbar pain and spasm    Cervical radiculopathy  -     baclofen 10 mg tablet; 1 tablet every 8 hours as needed for lumbar pain and spasm          Subjective:   Chief Complaint   Patient presents with    Back Pain      x 4-5 days           Patient ID: Azul Cool is a 53 y o  female  Patient is here for low back pain that began on Saturday when she was trying to get out of bed Patient notes pain is mostly on the right side and does radiate to the right buttock and leg Patient also complains of pelvic pain and pressure She just finished her cycle Patient has had low back pain before Patient also notes an odor to her urine for last few days Patient stats tat she has had this kind of pain with UTI but with that generally has burning with urination She has no buringin with urination Her period ended last week on Thursday Patient has no weakness in the legs Patient has been taking tylenol and advil with some relief Patient has no incontinence with this    Back Pain   This is a recurrent problem  The current episode started in the past 7 days  The problem occurs constantly  The problem is unchanged   The pain is present in the lumbar spine and sacro-iliac  The quality of the pain is described as cramping  The pain radiates to the right thigh  The pain is at a severity of 6/10  The pain is moderate  The pain is the same all the time  The symptoms are aggravated by bending  Stiffness is present all day  Associated symptoms include pelvic pain  Pertinent negatives include no abdominal pain, bladder incontinence, bowel incontinence, chest pain, dysuria, fever, headaches, leg pain, numbness, paresis, paresthesias, perianal numbness, tingling, weakness or weight loss  She has tried analgesics for the symptoms  The treatment provided mild relief  The following portions of the patient's history were reviewed and updated as appropriate: allergies, current medications, past medical history, past social history and problem list     Review of Systems   Constitutional: Negative for fever and weight loss  Cardiovascular: Negative for chest pain  Gastrointestinal: Negative for abdominal pain and bowel incontinence  Genitourinary: Positive for pelvic pain  Negative for bladder incontinence, decreased urine volume, dysuria, flank pain and frequency  Odor to the urine   Musculoskeletal: Positive for back pain  Neurological: Negative for tingling, weakness, numbness, headaches and paresthesias  Objective:      /78   Ht 5' 7" (1 702 m)   Wt 85 7 kg (189 lb)   BMI 29 60 kg/m²          Physical Exam   Constitutional: She appears well-developed and well-nourished  HENT:   Head: Normocephalic and atraumatic  Right Ear: External ear normal    Left Ear: External ear normal    Eyes: Pupils are equal, round, and reactive to light  EOM are normal    Cardiovascular: Normal rate and regular rhythm  Pulmonary/Chest: Effort normal    Abdominal: Soft     Supra pubic pain   Musculoskeletal:   Pain to palpation of the right PSIS Patient had flexion to 45 and extension to 0 and her side bending and rotaiton is to 40 Patient gait is normal Patent has normal strength in both legs There is some paraspinal muscle tightness b/l lumbar area   Neurological: She is alert  Nursing note and vitals reviewed

## 2019-10-08 NOTE — ASSESSMENT & PLAN NOTE
UA shows white cells and red blood cells with the odor thus will treat with the cipro as directed and patient will also see her GYN

## 2019-10-16 ENCOUNTER — OFFICE VISIT (OUTPATIENT)
Dept: OBGYN CLINIC | Facility: CLINIC | Age: 44
End: 2019-10-16

## 2019-10-16 VITALS
SYSTOLIC BLOOD PRESSURE: 133 MMHG | WEIGHT: 191.2 LBS | BODY MASS INDEX: 29.95 KG/M2 | DIASTOLIC BLOOD PRESSURE: 89 MMHG | HEART RATE: 86 BPM

## 2019-10-16 DIAGNOSIS — N76.0 BACTERIAL VAGINOSIS: Primary | ICD-10-CM

## 2019-10-16 DIAGNOSIS — R10.2 PELVIC PAIN: ICD-10-CM

## 2019-10-16 DIAGNOSIS — B96.89 BACTERIAL VAGINOSIS: Primary | ICD-10-CM

## 2019-10-16 DIAGNOSIS — M54.41 ACUTE MIDLINE LOW BACK PAIN WITH RIGHT-SIDED SCIATICA: ICD-10-CM

## 2019-10-16 PROBLEM — M54.50 ACUTE MIDLINE LOW BACK PAIN: Status: ACTIVE | Noted: 2019-10-16

## 2019-10-16 LAB
BV WHIFF TEST VAG QL: POSITIVE
CLUE CELLS SPEC QL WET PREP: POSITIVE
PH SMN: 5.5 [PH]
SL AMB POCT WET MOUNT: ABNORMAL
T VAGINALIS VAG QL WET PREP: NEGATIVE
YEAST VAG QL WET PREP: NEGATIVE

## 2019-10-16 PROCEDURE — 99202 OFFICE O/P NEW SF 15 MIN: CPT | Performed by: NURSE PRACTITIONER

## 2019-10-16 PROCEDURE — 87210 SMEAR WET MOUNT SALINE/INK: CPT | Performed by: NURSE PRACTITIONER

## 2019-10-16 RX ORDER — METRONIDAZOLE 500 MG/1
500 TABLET ORAL EVERY 12 HOURS SCHEDULED
Qty: 14 TABLET | Refills: 0 | Status: SHIPPED | OUTPATIENT
Start: 2019-10-16 | End: 2019-10-23

## 2019-10-16 NOTE — PATIENT INSTRUCTIONS
Use Metronidazole as directed  Make appointment for pelvic ultrasound  Keep appointment for Annual GYN exam and PAP      We are Moving!!  Dear Friends and Patients, We are excited to announce that we will be moving to a new nearby location in early December, 2019! The new address is:    [address], [address] [address]    Please be on the lookout for additional information as it becomes available  Thank you for trusting us with your care and we look forward to continuing to serve you in the future

## 2020-01-28 ENCOUNTER — ANNUAL EXAM (OUTPATIENT)
Dept: OBGYN CLINIC | Facility: CLINIC | Age: 45
End: 2020-01-28

## 2020-01-28 VITALS
SYSTOLIC BLOOD PRESSURE: 129 MMHG | HEART RATE: 79 BPM | BODY MASS INDEX: 29.98 KG/M2 | DIASTOLIC BLOOD PRESSURE: 84 MMHG | HEIGHT: 67 IN | WEIGHT: 191 LBS

## 2020-01-28 DIAGNOSIS — Z12.4 SCREENING FOR CERVICAL CANCER: ICD-10-CM

## 2020-01-28 DIAGNOSIS — Z12.31 ENCOUNTER FOR SCREENING MAMMOGRAM FOR MALIGNANT NEOPLASM OF BREAST: ICD-10-CM

## 2020-01-28 DIAGNOSIS — Z01.419 ENCOUNTER FOR ANNUAL ROUTINE GYNECOLOGICAL EXAMINATION: Primary | ICD-10-CM

## 2020-01-28 PROCEDURE — 99396 PREV VISIT EST AGE 40-64: CPT | Performed by: NURSE PRACTITIONER

## 2020-01-28 PROCEDURE — G0145 SCR C/V CYTO,THINLAYER,RESCR: HCPCS | Performed by: NURSE PRACTITIONER

## 2020-01-28 PROCEDURE — 87624 HPV HI-RISK TYP POOLED RSLT: CPT | Performed by: NURSE PRACTITIONER

## 2020-01-28 NOTE — PATIENT INSTRUCTIONS
PAP results will be available in 2 weeks  Make [facility] appointment  Make appointment for pelvic ultrasound previously  Ordered  Make appointment to discuss fertility and ultrasound results  Call with needs or concerns

## 2020-01-28 NOTE — LETTER
2020    To Mansi TORRES: 1975      This letter is to advise you that your recent PAP SMEAR results were reviewed by me and are NORMAL    We will see you in 1 year for your annual exam     Daniel Hansen

## 2020-01-28 NOTE — PROGRESS NOTES
Annual Exam    Assessment   1  Encounter for annual routine gynecological examination     2  Encounter for screening mammogram for malignant neoplasm of breast  Mammo screening bilateral w cad   3  Screening for cervical cancer  Liquid-based pap, screening     well woman       Plan       All questions answered  Breast self exam technique reviewed and patient encouraged to perform self-exam monthly  Contraception: none  Discussed healthy lifestyle modifications  Follow up in 1 year  Thin prep Pap smear  Patient Instructions   PAP results will be available in 2 weeks  Make [facility] appointment  Make appointment for pelvic ultrasound previously  Ordered  Make appointment to discuss fertility and ultrasound results  Call with needs or concerns   Pt verbalized understanding of all discussed  Subjective      Daryle Maple is a 53 y o  No obstetric history on file  female who presents for annual well woman exam  Periods are regular every 28-30 days, lasting 10 days  States some periods are shorter but she only has 1 period per month  No intermenstrual bleeding, spotting, or discharge  Pt states she still has intermittent pelvic pain, now has insurance and plans to have pelvic U/S done  Pt is interested in a 6th child, aware in increased genetic risk and that it may be more difficult to conceive  Pt states she took medication to become pregnant with her 6and 5year old    States she is unsure when last PAP was done but states all  all have been WNL    Current contraception: none  History of abnormal Pap smear: no  Family history of uterine or ovarian cancer: no  Regular self breast exam: yes  History of abnormal mammogram: no  Family history of breast cancer: no  History of abnormal lipids: unknown  Menstrual History:  OB History        6    Para   6    Term   5       1    AB        Living   5       SAB        TAB        Ectopic        Multiple        Live Births   11                Menarche age: 10  Patient's last menstrual period was 01/15/2020  The following portions of the patient's history were reviewed and updated as appropriate: allergies, current medications, past family history, past medical history, past social history, past surgical history and problem list     Review of Systems  Pertinent items are noted in HPI        Objective      /84   Pulse 79   Ht 5' 7" (1 702 m)   Wt 86 6 kg (191 lb)   LMP 01/15/2020   BMI 29 91 kg/m²     General: alert and oriented, in no acute distress, alert, appears stated age and cooperative   Heart: regular rate and rhythm, S1, S2 normal, no murmur, click, rub or gallop   Lungs: clear to auscultation bilaterally   Thyroid: Negative masses   Abdomen: soft, non-tender, without masses or organomegaly   Vulva: normal   Vagina: normal mucosa   Cervix: no cervical motion tenderness and no lesions   Uterus: normal size, non-tender, normal shape and consistency   Adnexa: normal adnexa   Urethra: normal   Breasts: NT,negative masses, discharge, or dimpling

## 2020-01-29 LAB
HPV HR 12 DNA CVX QL NAA+PROBE: NEGATIVE
HPV16 DNA CVX QL NAA+PROBE: NEGATIVE
HPV18 DNA CVX QL NAA+PROBE: NEGATIVE

## 2020-01-30 ENCOUNTER — HOSPITAL ENCOUNTER (OUTPATIENT)
Dept: MAMMOGRAPHY | Facility: MEDICAL CENTER | Age: 45
Discharge: HOME | End: 2020-01-30
Payer: COMMERCIAL

## 2020-01-30 VITALS — WEIGHT: 191 LBS | HEIGHT: 67 IN | BODY MASS INDEX: 29.98 KG/M2

## 2020-01-30 DIAGNOSIS — Z12.31 ENCOUNTER FOR SCREENING MAMMOGRAM FOR MALIGNANT NEOPLASM OF BREAST: ICD-10-CM

## 2020-01-30 LAB
LAB AP GYN PRIMARY INTERPRETATION: NORMAL
Lab: NORMAL

## 2020-01-30 PROCEDURE — 77067 SCR MAMMO BI INCL CAD: CPT

## 2020-02-25 DIAGNOSIS — M54.41 ACUTE MIDLINE LOW BACK PAIN WITH RIGHT-SIDED SCIATICA: ICD-10-CM

## 2020-02-25 RX ORDER — NAPROXEN 500 MG/1
TABLET ORAL
Qty: 30 TABLET | Refills: 0 | Status: SHIPPED | OUTPATIENT
Start: 2020-02-25 | End: 2020-03-16

## 2020-03-03 DIAGNOSIS — R10.2 PELVIC PAIN: Primary | ICD-10-CM

## 2020-03-14 DIAGNOSIS — M54.41 ACUTE MIDLINE LOW BACK PAIN WITH RIGHT-SIDED SCIATICA: ICD-10-CM

## 2020-03-16 RX ORDER — NAPROXEN 500 MG/1
TABLET ORAL
Qty: 30 TABLET | Refills: 0 | Status: SHIPPED | OUTPATIENT
Start: 2020-03-16 | End: 2020-04-17

## 2020-04-15 ENCOUNTER — TELEPHONE (OUTPATIENT)
Dept: FAMILY MEDICINE CLINIC | Facility: CLINIC | Age: 45
End: 2020-04-15

## 2020-04-16 DIAGNOSIS — M54.12 CERVICAL RADICULOPATHY: ICD-10-CM

## 2020-04-16 DIAGNOSIS — M54.2 NECK PAIN ON LEFT SIDE: ICD-10-CM

## 2020-04-16 RX ORDER — BACLOFEN 10 MG/1
TABLET ORAL
Qty: 30 TABLET | Refills: 0 | Status: SHIPPED | OUTPATIENT
Start: 2020-04-16 | End: 2020-11-16

## 2020-04-17 DIAGNOSIS — M54.41 ACUTE MIDLINE LOW BACK PAIN WITH RIGHT-SIDED SCIATICA: ICD-10-CM

## 2020-04-17 RX ORDER — NAPROXEN 500 MG/1
TABLET ORAL
Qty: 30 TABLET | Refills: 0 | Status: SHIPPED | OUTPATIENT
Start: 2020-04-17 | End: 2020-05-11

## 2020-05-09 DIAGNOSIS — M54.41 ACUTE MIDLINE LOW BACK PAIN WITH RIGHT-SIDED SCIATICA: ICD-10-CM

## 2020-05-11 RX ORDER — NAPROXEN 500 MG/1
TABLET ORAL
Qty: 30 TABLET | Refills: 0 | Status: SHIPPED | OUTPATIENT
Start: 2020-05-11 | End: 2020-06-08

## 2020-05-18 ENCOUNTER — OFFICE VISIT (OUTPATIENT)
Dept: FAMILY MEDICINE CLINIC | Facility: CLINIC | Age: 45
End: 2020-05-18
Payer: COMMERCIAL

## 2020-05-18 VITALS
WEIGHT: 194 LBS | TEMPERATURE: 98 F | BODY MASS INDEX: 30.45 KG/M2 | HEIGHT: 67 IN | DIASTOLIC BLOOD PRESSURE: 84 MMHG | SYSTOLIC BLOOD PRESSURE: 132 MMHG

## 2020-05-18 DIAGNOSIS — M54.41 CHRONIC RIGHT-SIDED LOW BACK PAIN WITH RIGHT-SIDED SCIATICA: Primary | ICD-10-CM

## 2020-05-18 DIAGNOSIS — G89.29 CHRONIC RIGHT-SIDED LOW BACK PAIN WITH RIGHT-SIDED SCIATICA: Primary | ICD-10-CM

## 2020-05-18 DIAGNOSIS — M77.11 RIGHT LATERAL EPICONDYLITIS: ICD-10-CM

## 2020-05-18 PROCEDURE — 3008F BODY MASS INDEX DOCD: CPT | Performed by: FAMILY MEDICINE

## 2020-05-18 PROCEDURE — 1036F TOBACCO NON-USER: CPT | Performed by: FAMILY MEDICINE

## 2020-05-18 PROCEDURE — 99214 OFFICE O/P EST MOD 30 MIN: CPT | Performed by: FAMILY MEDICINE

## 2020-05-18 PROCEDURE — 96372 THER/PROPH/DIAG INJ SC/IM: CPT | Performed by: FAMILY MEDICINE

## 2020-05-18 PROCEDURE — 3008F BODY MASS INDEX DOCD: CPT | Performed by: NURSE PRACTITIONER

## 2020-05-18 RX ORDER — PREDNISONE 10 MG/1
TABLET ORAL
Qty: 18 TABLET | Refills: 0 | Status: SHIPPED | OUTPATIENT
Start: 2020-05-18 | End: 2020-11-16 | Stop reason: ALTCHOICE

## 2020-05-18 RX ORDER — KETOROLAC TROMETHAMINE 30 MG/ML
30 INJECTION, SOLUTION INTRAMUSCULAR; INTRAVENOUS ONCE
Status: COMPLETED | OUTPATIENT
Start: 2020-05-18 | End: 2020-05-18

## 2020-05-18 RX ADMIN — KETOROLAC TROMETHAMINE 30 MG: 30 INJECTION, SOLUTION INTRAMUSCULAR; INTRAVENOUS at 16:13

## 2020-06-08 DIAGNOSIS — M54.41 ACUTE MIDLINE LOW BACK PAIN WITH RIGHT-SIDED SCIATICA: ICD-10-CM

## 2020-06-08 RX ORDER — NAPROXEN 500 MG/1
TABLET ORAL
Qty: 30 TABLET | Refills: 0 | Status: SHIPPED | OUTPATIENT
Start: 2020-06-08 | End: 2021-09-16

## 2020-11-15 ENCOUNTER — NURSE TRIAGE (OUTPATIENT)
Dept: OTHER | Facility: OTHER | Age: 45
End: 2020-11-15

## 2020-11-16 ENCOUNTER — TELEPHONE (OUTPATIENT)
Dept: FAMILY MEDICINE CLINIC | Facility: CLINIC | Age: 45
End: 2020-11-16

## 2020-11-16 DIAGNOSIS — M54.41 CHRONIC RIGHT-SIDED LOW BACK PAIN WITH RIGHT-SIDED SCIATICA: Primary | ICD-10-CM

## 2020-11-16 DIAGNOSIS — G89.29 CHRONIC RIGHT-SIDED LOW BACK PAIN WITH RIGHT-SIDED SCIATICA: Primary | ICD-10-CM

## 2020-11-16 RX ORDER — PREDNISONE 10 MG/1
TABLET ORAL
Qty: 30 TABLET | Refills: 0 | Status: SHIPPED | OUTPATIENT
Start: 2020-11-16 | End: 2021-09-16

## 2020-11-16 RX ORDER — TIZANIDINE HYDROCHLORIDE 4 MG/1
4 CAPSULE, GELATIN COATED ORAL 3 TIMES DAILY PRN
Qty: 30 CAPSULE | Refills: 0 | Status: SHIPPED | OUTPATIENT
Start: 2020-11-16 | End: 2021-09-16

## 2021-01-15 ENCOUNTER — OFFICE VISIT (OUTPATIENT)
Dept: FAMILY MEDICINE CLINIC | Facility: CLINIC | Age: 46
End: 2021-01-15

## 2021-01-15 VITALS
WEIGHT: 193 LBS | BODY MASS INDEX: 30.29 KG/M2 | HEIGHT: 67 IN | SYSTOLIC BLOOD PRESSURE: 110 MMHG | DIASTOLIC BLOOD PRESSURE: 70 MMHG | TEMPERATURE: 95.9 F

## 2021-01-15 DIAGNOSIS — S05.01XA ABRASION OF RIGHT CORNEA, INITIAL ENCOUNTER: Primary | ICD-10-CM

## 2021-01-15 DIAGNOSIS — M26.621 TMJ TENDERNESS, RIGHT: ICD-10-CM

## 2021-01-15 PROBLEM — B96.89 BACTERIAL VAGINOSIS: Status: RESOLVED | Noted: 2019-10-16 | Resolved: 2021-01-15

## 2021-01-15 PROBLEM — N76.0 BACTERIAL VAGINOSIS: Status: RESOLVED | Noted: 2019-10-16 | Resolved: 2021-01-15

## 2021-01-15 PROCEDURE — 99213 OFFICE O/P EST LOW 20 MIN: CPT | Performed by: FAMILY MEDICINE

## 2021-01-15 RX ORDER — TRAMADOL HYDROCHLORIDE 50 MG/1
50 TABLET ORAL EVERY 6 HOURS PRN
Qty: 10 TABLET | Refills: 0 | Status: SHIPPED | OUTPATIENT
Start: 2021-01-15 | End: 2021-09-16

## 2021-01-15 RX ORDER — POLYMYXIN B SULFATE AND TRIMETHOPRIM 1; 10000 MG/ML; [USP'U]/ML
1 SOLUTION OPHTHALMIC EVERY 6 HOURS
Qty: 10 ML | Refills: 0 | Status: SHIPPED | OUTPATIENT
Start: 2021-01-15 | End: 2021-09-16

## 2021-01-15 NOTE — PROGRESS NOTES
Assessment/Plan:    Right corneal abrasion  Patient to use the eye drops as directed and take the pain medication    TMJ tenderness, right  Stop chewing gum and warm compresses Patient to followup with the dentist        Diagnoses and all orders for this visit:    Abrasion of right cornea, initial encounter  -     traMADol (ULTRAM) 50 mg tablet; Take 1 tablet (50 mg total) by mouth every 6 (six) hours as needed for moderate pain or severe pain  -     polymyxin b-trimethoprim (POLYTRIM) ophthalmic solution; Administer 1 drop to the right eye every 6 (six) hours    TMJ tenderness, right          Subjective:   Chief Complaint   Patient presents with    Earache     right ear-"started this am"    Eye Problem     right eye-"last night-it feels like something is in my eye"        Patient ID: Esau Collet is a 50 y o  female  Patient is here with right ear pain today Patient has right eye with redness and and itching Patient felt she got a hair stuck in it last night Patient flushed the eye with saline Since then there is pain and itching       The following portions of the patient's history were reviewed and updated as appropriate: allergies, current medications, past family history, past medical history, past social history, past surgical history and problem list     Review of Systems   Constitutional: Negative for fatigue and fever  HENT: Positive for ear pain  Right ear and right jaw pain   Eyes: Positive for photophobia, pain and itching  Negative for discharge, redness and visual disturbance  Objective:      /70   Temp (!) 95 9 °F (35 5 °C)   Ht 5' 7" (1 702 m)   Wt 87 5 kg (193 lb)   BMI 30 23 kg/m²          Physical Exam  Constitutional:       Appearance: Normal appearance  HENT:      Head: Normocephalic        Right Ear: Tympanic membrane normal       Left Ear: Tympanic membrane normal       Ears:      Comments: Pain at the angle of the right jaw with crepitance  Eyes: General:         Right eye: Discharge present  Left eye: No discharge  Extraocular Movements: Extraocular movements intact  Pupils: Pupils are equal, round, and reactive to light  Comments: Using wood's lamp there is an area of uptake under the right pupil   Neurological:      Mental Status: She is alert

## 2021-01-15 NOTE — PATIENT INSTRUCTIONS
Corneal Abrasion   AMBULATORY CARE:   A corneal abrasion  is a scratch on the cornea of your eye  The cornea is the clear layer that covers the front of your eye  A small scratch may heal in 1 to 2 days  Deeper or larger scratches may take longer to heal  Do not touch or rub your eye while it is healing  Common signs and symptoms:   · Pain    · More tears than usual    · Redness    · A feeling that you have something in your eye    · Blurred vision    · Sensitivity to bright light    · Headache    Call your healthcare provider or ophthalmologist if:   · Your eye pain or vision gets worse  · You have yellow or green drainage from your eye  · You have questions or concerns about your condition or care  Treatment for a corneal abrasion  may include antibiotic eyedrops or ointment to help prevent an eye infection  You may also be given eyedrops to decrease pain  Care for your eyes:   · Get regular eye exams  Get your eyes checked at least every year  · Eat healthy foods  Fresh fruits and vegetables that are rich in vitamins A and C may help with your vision  Foods such as sweet potatoes, apricots, and carrots are rich in good nutrients for the eyes  · Take care of your contacts or glasses  Store, clean, and use your contacts or glasses as directed  Replace your glasses or contact lenses as often as your healthcare provider suggests  · Decrease eye strain  Rest your eyes, especially after you read or sew for long periods of time  Get plenty of sleep at night  Use lights that reduce glare in your home, school, or workplace  · Wear dark sunglasses  This will help prevent pain and light sensitivity  Make sure the sunglasses have UVA and UVB protection  This will protect your eyes when you go outside  · Use eyedrops safely  If your treatment plan includes eyedrops, it is important to use them as directed  Your provider may give you detailed instructions to follow   The eyedrops may also come with safety instructions  Follow all instructions to help prevent an infection  Do not touch the tip of the bottle to your eye  Germs from your eye can spread to the medicine bottle  Prevent corneal abrasions:   · Remove your contact lenses if your eyes feel dry or irritated  · Wash your hands if you need to touch your eyes or your face  · Trim your child's fingernails so he or she cannot scratch his or her eye  · Wear protective eyewear when you work with chemicals, wood, dust, or metal     · Wear protective eyewear when you play sports  · Do not wear your contacts for longer than you should  · Do not wear colored lenses or lenses with shapes on them  These lenses may cause eye damage and vision loss  · Do not wear glitter makeup  Glitter can easily get into your eyes and under contact lenses  · Do not sleep with your contacts in  Follow up with your healthcare provider as directed:  Write down your questions so you remember to ask them during your visits  © Copyright [address] Information is for End User's use only and may not be sold, redistributed or otherwise used for commercial purposes  All illustrations and images included in CareNotes® are the copyrighted property of [employer]  or [address] [place], [place] [address]alivia Hampton   The above information is an  only  It is not intended as medical advice for individual conditions or treatments  Talk to your doctor, nurse or pharmacist before following any medical regimen to see if it is safe and effective for you

## 2021-09-16 ENCOUNTER — OFFICE VISIT (OUTPATIENT)
Dept: FAMILY MEDICINE CLINIC | Facility: CLINIC | Age: 46
End: 2021-09-16

## 2021-09-16 VITALS
SYSTOLIC BLOOD PRESSURE: 118 MMHG | TEMPERATURE: 97.9 F | BODY MASS INDEX: 30.61 KG/M2 | HEIGHT: 67 IN | WEIGHT: 195 LBS | DIASTOLIC BLOOD PRESSURE: 80 MMHG

## 2021-09-16 DIAGNOSIS — M72.2 PLANTAR FASCIITIS OF LEFT FOOT: Primary | ICD-10-CM

## 2021-09-16 DIAGNOSIS — I83.892 VARICOSE VEINS OF LEFT LEG WITH EDEMA: ICD-10-CM

## 2021-09-16 PROCEDURE — 99212 OFFICE O/P EST SF 10 MIN: CPT | Performed by: FAMILY MEDICINE

## 2021-09-16 RX ORDER — PREDNISONE 10 MG/1
TABLET ORAL
Qty: 30 TABLET | Refills: 0 | Status: SHIPPED | OUTPATIENT
Start: 2021-09-16 | End: 2021-12-03

## 2021-09-16 NOTE — PROGRESS NOTES
Assessment/Plan:    No problem-specific Assessment & Plan notes found for this encounter  Diagnoses and all orders for this visit:    Plantar fasciitis of left foot  Comments:   prednisone and ice at the in the day  Reinforce the need for good supportive footwear  Orders:  -     predniSONE 10 mg tablet; 5 x 2 days, 4 x 2 days, 3 x 2 days,2 x 2 days, 1 x 2 days  Varicose veins of left leg with edema  Comments:    Recommend support stockings  Subjective:   Chief Complaint   Patient presents with    Pain     left heel,calf           Patient ID: Vanna Valadez is a 48 y o  female  She is complaining of pain in her left foot mostly along the inner arch and into the heel  She is also complaining of some swelling and discomfort in her left calf  States disease both have been going on for over a couple weeks  She does not remember any particular trauma  The calf has been going on for a bit longer actually  She is on her feet a lot, she wears sandals mostly during the day  The following portions of the patient's history were reviewed and updated as appropriate: allergies, current medications, past family history, past medical history, past social history, past surgical history and problem list     Review of Systems   Constitutional: Negative for chills and fever  HENT: Negative for ear pain and sore throat  Eyes: Negative for pain and visual disturbance  Respiratory: Negative for cough and shortness of breath  Cardiovascular: Positive for leg swelling  Negative for chest pain and palpitations  Gastrointestinal: Negative for abdominal pain and vomiting  Genitourinary: Negative for dysuria and hematuria  Musculoskeletal: Positive for arthralgias and gait problem  Negative for back pain  Skin: Negative for color change and rash  Neurological: Negative for seizures and syncope  Hematological: Does not bruise/bleed easily  All other systems reviewed and are negative  Objective:      /80   Temp 97 9 °F (36 6 °C)   Ht 5' 7" (1 702 m)   Wt 88 5 kg (195 lb)   BMI 30 54 kg/m²          Physical Exam  Vitals and nursing note reviewed  Constitutional:       General: She is not in acute distress  Appearance: She is well-developed  HENT:      Head: Normocephalic and atraumatic  Right Ear: Tympanic membrane, ear canal and external ear normal       Left Ear: Tympanic membrane, ear canal and external ear normal       Nose: Nose normal    Eyes:      Conjunctiva/sclera: Conjunctivae normal       Pupils: Pupils are equal, round, and reactive to light  Neck:      Thyroid: No thyromegaly  Vascular: No JVD  Trachea: No tracheal deviation  Cardiovascular:      Rate and Rhythm: Normal rate and regular rhythm  Heart sounds: Normal heart sounds  No murmur heard  Comments: Varicosities bilaterally, nonpitting edema bilaterally  Pulmonary:      Effort: Pulmonary effort is normal       Breath sounds: Normal breath sounds  No wheezing or rales  Abdominal:      General: Bowel sounds are normal       Palpations: Abdomen is soft  There is no mass  Tenderness: There is no abdominal tenderness  There is no guarding or rebound  Musculoskeletal:         General: Normal range of motion  Cervical back: Normal range of motion and neck supple  Left foot: Swelling and tenderness (  Along the medial arch) present  Comments:  No calcaneal tenderness  Lymphadenopathy:      Cervical: No cervical adenopathy  Skin:     General: Skin is warm and dry  Findings: No lesion or rash  Neurological:      Mental Status: She is alert and oriented to person, place, and time  Cranial Nerves: No cranial nerve deficit  Deep Tendon Reflexes: Reflexes are normal and symmetric     Psychiatric:         Judgment: Judgment normal

## 2021-12-02 ENCOUNTER — APPOINTMENT (EMERGENCY)
Dept: RADIOLOGY | Facility: HOSPITAL | Age: 46
End: 2021-12-02
Payer: COMMERCIAL

## 2021-12-02 ENCOUNTER — APPOINTMENT (EMERGENCY)
Dept: CT IMAGING | Facility: HOSPITAL | Age: 46
End: 2021-12-02
Payer: COMMERCIAL

## 2021-12-02 ENCOUNTER — HOSPITAL ENCOUNTER (EMERGENCY)
Facility: HOSPITAL | Age: 46
Discharge: HOME | End: 2021-12-02
Attending: EMERGENCY MEDICINE | Admitting: EMERGENCY MEDICINE
Payer: COMMERCIAL

## 2021-12-02 VITALS
RESPIRATION RATE: 18 BRPM | HEART RATE: 72 BPM | BODY MASS INDEX: 31.77 KG/M2 | WEIGHT: 202.82 LBS | OXYGEN SATURATION: 98 % | SYSTOLIC BLOOD PRESSURE: 132 MMHG | DIASTOLIC BLOOD PRESSURE: 97 MMHG | TEMPERATURE: 97.6 F

## 2021-12-02 DIAGNOSIS — R51.9 HEADACHE: ICD-10-CM
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
## 2021-12-02 DIAGNOSIS — S16.1XXA ACUTE STRAIN OF NECK MUSCLE, INITIAL ENCOUNTER: ICD-10-CM

## 2021-12-02 DIAGNOSIS — M47.812 DJD (DEGENERATIVE JOINT DISEASE) OF CERVICAL SPINE: ICD-10-CM

## 2021-12-02 DIAGNOSIS — S09.90XA CLOSED HEAD INJURY, INITIAL ENCOUNTER: ICD-10-CM

## 2021-12-02 PROCEDURE — 70450 CT HEAD/BRAIN W/O DYE: CPT

## 2021-12-02 PROCEDURE — 99284 EMERGENCY DEPT VISIT MOD MDM: CPT

## 2021-12-02 PROCEDURE — 99284 EMERGENCY DEPT VISIT MOD MDM: CPT | Performed by: PHYSICIAN ASSISTANT

## 2021-12-02 PROCEDURE — 72125 CT NECK SPINE W/O DYE: CPT

## 2021-12-02 PROCEDURE — 73000 X-RAY EXAM OF COLLAR BONE: CPT

## 2021-12-02 RX ORDER — METHOCARBAMOL 500 MG/1
500 TABLET, FILM COATED ORAL 4 TIMES DAILY
Qty: 40 TABLET | Refills: 0 | Status: SHIPPED | OUTPATIENT
Start: 2021-12-02 | End: 2021-12-21 | Stop reason: ALTCHOICE

## 2021-12-02 RX ORDER — NAPROXEN 500 MG/1
500 TABLET ORAL 2 TIMES DAILY WITH MEALS
Qty: 20 TABLET | Refills: 0 | Status: SHIPPED | OUTPATIENT
Start: 2021-12-02 | End: 2022-02-28

## 2021-12-02 RX ORDER — ACETAMINOPHEN 325 MG/1
975 TABLET ORAL ONCE AS NEEDED
Status: COMPLETED | OUTPATIENT
Start: 2021-12-02 | End: 2021-12-02

## 2021-12-02 RX ADMIN — ACETAMINOPHEN 975 MG: 325 TABLET, FILM COATED ORAL at 12:48

## 2021-12-03 ENCOUNTER — TELEPHONE (OUTPATIENT)
Dept: PHYSICAL THERAPY | Facility: OTHER | Age: 46
End: 2021-12-03

## 2021-12-03 ENCOUNTER — OFFICE VISIT (OUTPATIENT)
Dept: FAMILY MEDICINE CLINIC | Facility: CLINIC | Age: 46
End: 2021-12-03
Payer: COMMERCIAL

## 2021-12-03 VITALS — TEMPERATURE: 97.2 F | DIASTOLIC BLOOD PRESSURE: 70 MMHG | SYSTOLIC BLOOD PRESSURE: 128 MMHG

## 2021-12-03 DIAGNOSIS — M54.2 NECK PAIN, BILATERAL: ICD-10-CM

## 2021-12-03 DIAGNOSIS — M25.512 ACUTE PAIN OF LEFT SHOULDER: ICD-10-CM

## 2021-12-03 DIAGNOSIS — M54.6 ACUTE BILATERAL THORACIC BACK PAIN: ICD-10-CM
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
## 2021-12-03 PROCEDURE — 99214 OFFICE O/P EST MOD 30 MIN: CPT | Performed by: FAMILY MEDICINE

## 2021-12-03 RX ORDER — CYCLOBENZAPRINE HCL 10 MG
10 TABLET ORAL 3 TIMES DAILY PRN
Qty: 30 TABLET | Refills: 0 | Status: SHIPPED | OUTPATIENT
Start: 2021-12-03 | End: 2021-12-21 | Stop reason: ALTCHOICE

## 2021-12-07 ENCOUNTER — TELEPHONE (OUTPATIENT)
Dept: PHYSICAL THERAPY | Facility: OTHER | Age: 46
End: 2021-12-07

## 2021-12-21 ENCOUNTER — OFFICE VISIT (OUTPATIENT)
Dept: FAMILY MEDICINE CLINIC | Facility: CLINIC | Age: 46
End: 2021-12-21
Payer: COMMERCIAL

## 2021-12-21 VITALS
BODY MASS INDEX: 30.13 KG/M2 | TEMPERATURE: 97.9 F | HEIGHT: 67 IN | SYSTOLIC BLOOD PRESSURE: 120 MMHG | WEIGHT: 192 LBS | DIASTOLIC BLOOD PRESSURE: 78 MMHG

## 2021-12-21 DIAGNOSIS — M54.2 NECK PAIN, BILATERAL: ICD-10-CM

## 2021-12-21 DIAGNOSIS — M25.512 ACUTE PAIN OF LEFT SHOULDER: ICD-10-CM
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
## 2021-12-21 DIAGNOSIS — M54.6 ACUTE BILATERAL THORACIC BACK PAIN: ICD-10-CM

## 2021-12-21 PROCEDURE — 99213 OFFICE O/P EST LOW 20 MIN: CPT | Performed by: FAMILY MEDICINE

## 2021-12-21 RX ORDER — TRAMADOL HYDROCHLORIDE 50 MG/1
50 TABLET ORAL EVERY 6 HOURS PRN
Qty: 30 TABLET | Refills: 0 | Status: SHIPPED | OUTPATIENT
Start: 2021-12-21 | End: 2022-02-28

## 2021-12-21 RX ORDER — PREDNISONE 10 MG/1
TABLET ORAL
Qty: 30 TABLET | Refills: 0 | Status: SHIPPED | OUTPATIENT
Start: 2021-12-21 | End: 2022-02-16

## 2022-02-16 ENCOUNTER — TELEMEDICINE (OUTPATIENT)
Dept: FAMILY MEDICINE CLINIC | Facility: CLINIC | Age: 47
End: 2022-02-16

## 2022-02-16 DIAGNOSIS — H66.92 OTITIS OF LEFT EAR: Primary | ICD-10-CM

## 2022-02-16 PROCEDURE — 99213 OFFICE O/P EST LOW 20 MIN: CPT | Performed by: FAMILY MEDICINE

## 2022-02-16 RX ORDER — PREDNISONE 10 MG/1
TABLET ORAL
Qty: 15 TABLET | Refills: 0 | Status: SHIPPED | OUTPATIENT
Start: 2022-02-16 | End: 2022-02-28

## 2022-02-16 RX ORDER — SULFAMETHOXAZOLE AND TRIMETHOPRIM 800; 160 MG/1; MG/1
1 TABLET ORAL EVERY 12 HOURS SCHEDULED
Qty: 20 TABLET | Refills: 0 | Status: SHIPPED | OUTPATIENT
Start: 2022-02-16 | End: 2022-02-26

## 2022-02-16 NOTE — PROGRESS NOTES
COVID-19 Outpatient Progress Note    Assessment/Plan:    Problem List Items Addressed This Visit     None      Visit Diagnoses     Otitis of left ear    -  Primary    Relevant Medications    predniSONE 10 mg tablet    sulfamethoxazole-trimethoprim (BACTRIM DS) 800-160 mg per tablet         Disposition:     [address]    Your healthcare provider and/or public health staff have evaluated you and have determined that you do not need to remain in the hospital at this time   At this time you can be isolated at home where you will be monitored by staff from your local or state health department  You should carefully follow the prevention and isolation steps below until a healthcare provider or local or state health department says that you can return to your normal activities  Stay home except to get medical care    People who are mildly ill with COVID-19 are able to isolate at home during their illness  You should restrict activities outside your home, except for getting medical care  Do not go to work, school, or public areas  Avoid using public transportation, ride-sharing, or taxis  Separate yourself from other people and animals in your home    People: As much as possible, you should stay in a specific room and away from other people in your home  Also, you should use a separate bathroom, if available  Animals: You should restrict contact with pets and other animals while you are sick with COVID-19, just like you would around other people  Although there have not been reports of pets or other animals becoming sick with COVID-19, it is still recommended that people sick with COVID-19 limit contact with animals until more information is known about the virus  When possible, have another member of your household care for your animals while you are sick  If you are sick with COVID-19, avoid contact with your pet, including petting, snuggling, being kissed or licked, and sharing food   If you must care for your pet or be around animals while you are sick, wash your hands before and after you interact with pets and wear a facemask  See COVID-19 and Animals for more information  Call ahead before visiting your doctor    If you have a medical appointment, call the healthcare provider and tell them that you have or may have COVID-19  This will help the healthcare providers office take steps to keep other people from getting infected or exposed  Wear a facemask    You should wear a facemask when you are around other people (e g , sharing a room or vehicle) or pets and before you enter a healthcare providers office  If you are not able to wear a facemask (for example, because it causes trouble breathing), then people who live with you should not stay in the same room with you, or they should wear a facemask if they enter your room  Cover your coughs and sneezes    Cover your mouth and nose with a tissue when you cough or sneeze  Throw used tissues in a lined trash can  Immediately wash your hands with soap and water for at least 20 seconds or, if soap and water are not available, clean your hands with an alcohol-based hand  that contains at least 60% alcohol  Clean your hands often    Wash your hands often with soap and water for at least 20 seconds, especially after blowing your nose, coughing, or sneezing; going to the bathroom; and before eating or preparing food  If soap and water are not readily available, use an alcohol-based hand  with at least 60% alcohol, covering all surfaces of your hands and rubbing them together until they feel dry  Soap and water are the best option if hands are visibly dirty  Avoid touching your eyes, nose, and mouth with unwashed hands  Avoid sharing personal household items    You should not share dishes, drinking glasses, cups, eating utensils, towels, or bedding with other people or pets in your home   After using these items, they should be washed thoroughly with soap and water  Clean all high-touch surfaces everyday    High touch surfaces include counters, tabletops, doorknobs, bathroom fixtures, toilets, phones, keyboards, tablets, and bedside tables  Also, clean any surfaces that may have blood, stool, or body fluids on them  Use a household cleaning spray or wipe, according to the label instructions  Labels contain instructions for safe and effective use of the cleaning product including precautions you should take when applying the product, such as wearing gloves and making sure you have good ventilation during use of the product  Monitor your symptoms    Seek prompt medical attention if your illness is worsening (e g , difficulty breathing)  Before seeking care, call your healthcare provider and tell them that you have, or are being evaluated for, COVID-19  Put on a facemask before you enter the facility  These steps will help the healthcare providers office to keep other people in the office or waiting room from getting infected or exposed  Ask your healthcare provider to call the local or [facility] health department  Persons who are placed under active monitoring or facilitated self-monitoring should follow instructions provided by their local health department or occupational health professionals, as appropriate  If you have a medical emergency and need to call 911, notify the dispatch personnel that you have, or are being evaluated for COVID-19  If possible, put on a facemask before emergency medical services arrive  Discontinuing home isolation    Patients with confirmed COVID-19 should remain under home isolation precautions until the following conditions are met:    They have had no fever for at least 24 hours (that is one full day of no fever without the use medicine that reduces fevers)  AND  other symptoms have improved (for example, when their cough or shortness of breath have improved)  AND  If had mild or moderate illness, at least 10 days have passed since their symptoms first appeared or if severe illness (needed oxygen) or immunosuppressed, at least 20 days have passed since symptoms first appeared  Patients with confirmed COVID-19 should also notify close contacts (including their workplace) and ask that they self-quarantine  Currently, close contact is defined as being within 6 feet for 15 minutes or more from the period 24 hours starting 48 hours before symptom onset to the time at which the patient went into isolation   Close contacts of patients diagnosed with COVID-19 should be instructed by the patient to self-quarantine for 14 days from the last time of their last contact with the patient  Source: [employer]      I have spent 10 minutes directly with the patient  Greater than 50% of this time was spent in counseling/coordination of care regarding: prognosis, risks and benefits of treatment options, instructions for management, patient and family education, importance of treatment compliance, risk factor reductions and impressions  Without being able to exam the patient I cannot 100% say that she has an ear infection or not, I will empirically cover her and will also treat with steroids  Patient is aware that the home antigen test for not always 100% accurate  At this point she will quarantine at home for 5 days total and wear her mask for additional 5 days around other people after that  Encounter provider Minerva Mcbride DO    Provider located at [facility] [address]  [facility] [address] [phone]    Recent Visits  No visits were found meeting these conditions    Showing recent visits within past 7 days and meeting all other requirements  Today's Visits  Date Type Provider Dept   02/16/22 [address], [address] [place] [address] Primary Care   Showing today's visits and meeting all other requirements  Future Appointments  No visits were found meeting these conditions  Showing future appointments within next 150 days and meeting all other requirements       Patient agrees to participate in a virtual check in via telephone or video visit instead of presenting to the office to address urgent/immediate medical needs  Patient is aware this is a billable service  After connecting through [facility], the patient was identified by name and date of birth  Rosendo Yeboah was informed that this was a telemedicine visit and that the exam was being conducted confidentially over secure lines  My office door was closed  No one else was in the room  Rosendo Yeboah acknowledged consent and understanding of privacy and security of the telemedicine visit  I informed the patient that I have reviewed her record in Epic and presented the opportunity for her to ask any questions regarding the visit today  The patient agreed to participate  Verification of patient location:  Patient is located in the following state in which I hold an active license: [place]    Subjective:   Rosendo Yeboah is a 48 y o  female who is concerned about COVID-19  Patient's symptoms include fatigue, malaise, sore throat and cough  Patient denies fever, chills, congestion, rhinorrhea, anosmia, loss of taste, shortness of breath, chest tightness, nausea, diarrhea, myalgias and headaches       - Date of symptom onset: 2/14/2022      COVID-19 vaccination status: Fully vaccinated (primary series)    Exposure:   Contact with a person who is under investigation (PUI) for or who is positive for COVID-19 within the last 14 days?: Yes    Hospitalized recently for fever and/or lower respiratory symptoms?: No      Currently a healthcare worker that is involved in direct patient care?: No      Works in a special setting where the risk of COVID-19 transmission may be high? (this may include long-term care, correctional and FDC facilities; homeless shelters; assisted-living facilities and group homes ): No      Resident in a special setting where the risk of COVID-19 transmission may be high? (this may include long-term care, correctional and FDC facilities; homeless shelters; assisted-living facilities and group homes ): No      Her  had COVID, but she patient states that she was not around him he travels and drives truck  States that she has not been around her daughter or granddaughter her both positive for COVID  She did a home COVID test and it was negative    No results found for: [address] [facility] [address], 185 [facility], [address] [facility], [facility] 116, 350 [facility], 700 [facility]  Past Medical History:   Diagnosis Date    Acute cystitis with hematuria     Resolved 10/18/2017     Insomnia due to stress     Resolved 10/18/2017     No known health problems     Sciatica, left side     Resolved 6/17/2015     Somatic dysfunction of cervical region     Resolved 6/17/2015      Past Surgical History:   Procedure Laterality Date    NO PAST SURGERIES       Current Outpatient Medications   Medication Sig Dispense Refill    naproxen (EC NAPROSYN) 500 MG EC tablet Take 1 tablet (500 mg total) by mouth 2 (two) times a day with meals for 10 days 20 tablet 0    predniSONE 10 mg tablet 5 x 1 day, 4 x1 day, 3 x 1 day,2 x1 day,  1 x 1 day 15 tablet 0    sulfamethoxazole-trimethoprim (BACTRIM DS) 800-160 mg per tablet Take 1 tablet by mouth every 12 (twelve) hours for 10 days 20 tablet 0    traMADol (Ultram) 50 mg tablet Take 1 tablet (50 mg total) by mouth every 6 (six) hours as needed for moderate pain 30 tablet 0     No current facility-administered medications for this visit  Allergies   Allergen Reactions    Amoxicillin Hives    Penicillins        Review of Systems   Constitutional: Positive for fatigue  Negative for activity change, appetite change, chills and fever  HENT: Positive for sore throat   Negative for congestion and rhinorrhea  Respiratory: Positive for cough  Negative for chest tightness and shortness of breath  Gastrointestinal: Negative for diarrhea and nausea  Musculoskeletal: Negative for myalgias  Neurological: Negative for headaches  Objective: There were no vitals filed for this visit  Physical Exam  Constitutional:       Appearance: She is well-developed  Comments: Uncomfortable   HENT:      Head: Normocephalic and atraumatic  Eyes:      Conjunctiva/sclera: Conjunctivae normal    Pulmonary:      Effort: Pulmonary effort is normal    Neurological:      Mental Status: She is alert and oriented to person, place, and time  Psychiatric:         Judgment: Judgment normal          VIRTUAL VISIT DISCLAIMER    Zoey Quinones verbally agrees to participate in [employer]  Pt is aware that [employer] could be limited without vital signs or the ability to perform a full hands-on physical Mayelin Casarez understands she or the provider may request at any time to terminate the video visit and request the patient to seek care or treatment in person

## 2022-02-28 ENCOUNTER — OFFICE VISIT (OUTPATIENT)
Dept: FAMILY MEDICINE CLINIC | Facility: CLINIC | Age: 47
End: 2022-02-28

## 2022-02-28 VITALS
WEIGHT: 193.6 LBS | BODY MASS INDEX: 30.39 KG/M2 | HEIGHT: 67 IN | TEMPERATURE: 97.8 F | DIASTOLIC BLOOD PRESSURE: 78 MMHG | SYSTOLIC BLOOD PRESSURE: 118 MMHG

## 2022-02-28 DIAGNOSIS — J31.0 CHRONIC RHINITIS: ICD-10-CM

## 2022-02-28 DIAGNOSIS — R05.9 COUGH: ICD-10-CM

## 2022-02-28 DIAGNOSIS — H65.03 NON-RECURRENT ACUTE SEROUS OTITIS MEDIA OF BOTH EARS: Primary | ICD-10-CM
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
## 2022-02-28 PROBLEM — M54.41 CHRONIC RIGHT-SIDED LOW BACK PAIN WITH RIGHT-SIDED SCIATICA: Status: RESOLVED | Noted: 2019-10-08 | Resolved: 2022-02-28

## 2022-02-28 PROBLEM — S05.01XA RIGHT CORNEAL ABRASION: Status: RESOLVED | Noted: 2021-01-15 | Resolved: 2022-02-28

## 2022-02-28 PROBLEM — M54.2 NECK PAIN, BILATERAL: Status: RESOLVED | Noted: 2021-12-21 | Resolved: 2022-02-28

## 2022-02-28 PROBLEM — M54.50 ACUTE MIDLINE LOW BACK PAIN: Status: RESOLVED | Noted: 2019-10-16 | Resolved: 2022-02-28

## 2022-02-28 PROBLEM — Z12.31 ENCOUNTER FOR SCREENING MAMMOGRAM FOR MALIGNANT NEOPLASM OF BREAST: Status: RESOLVED | Noted: 2020-01-28 | Resolved: 2022-02-28

## 2022-02-28 PROBLEM — G89.29 CHRONIC RIGHT-SIDED LOW BACK PAIN WITH RIGHT-SIDED SCIATICA: Status: RESOLVED | Noted: 2019-10-08 | Resolved: 2022-02-28

## 2022-02-28 PROBLEM — R10.2 PELVIC PAIN: Status: RESOLVED | Noted: 2019-10-16 | Resolved: 2022-02-28

## 2022-02-28 PROBLEM — M54.6 ACUTE BILATERAL THORACIC BACK PAIN: Status: RESOLVED | Noted: 2021-12-21 | Resolved: 2022-02-28

## 2022-02-28 PROBLEM — M26.621 TMJ TENDERNESS, RIGHT: Status: RESOLVED | Noted: 2021-01-15 | Resolved: 2022-02-28

## 2022-02-28 PROBLEM — M77.11 RIGHT LATERAL EPICONDYLITIS: Status: RESOLVED | Noted: 2020-05-18 | Resolved: 2022-02-28

## 2022-02-28 PROBLEM — Z01.419 ENCOUNTER FOR ANNUAL ROUTINE GYNECOLOGICAL EXAMINATION: Status: RESOLVED | Noted: 2020-01-28 | Resolved: 2022-02-28

## 2022-02-28 PROBLEM — R82.90 ABNORMAL URINE ODOR: Status: RESOLVED | Noted: 2019-10-08 | Resolved: 2022-02-28

## 2022-02-28 PROBLEM — M25.512 ACUTE PAIN OF LEFT SHOULDER: Status: RESOLVED | Noted: 2021-12-21 | Resolved: 2022-02-28

## 2022-02-28 PROCEDURE — 99213 OFFICE O/P EST LOW 20 MIN: CPT | Performed by: FAMILY MEDICINE

## 2022-02-28 RX ORDER — BENZONATATE 200 MG/1
200 CAPSULE ORAL 3 TIMES DAILY PRN
Qty: 20 CAPSULE | Refills: 0 | Status: SHIPPED | OUTPATIENT
Start: 2022-02-28 | End: 2022-05-11

## 2022-02-28 RX ORDER — PREDNISONE 10 MG/1
TABLET ORAL
Qty: 30 TABLET | Refills: 0 | Status: SHIPPED | OUTPATIENT
Start: 2022-02-28 | End: 2022-05-11

## 2022-02-28 RX ORDER — FLUTICASONE PROPIONATE 50 MCG
2 SPRAY, SUSPENSION (ML) NASAL DAILY
Qty: 16 ML | Refills: 2 | Status: SHIPPED | OUTPATIENT
Start: 2022-02-28 | End: 2022-05-11

## 2022-02-28 NOTE — PROGRESS NOTES
Assessment/Plan:         Diagnoses and all orders for this visit:    Non-recurrent acute serous otitis media of both ears  Comments:  If no improvement, will refer to ENT  Take medication as directed  Orders:  -     predniSONE 10 mg tablet; 5 x 2 days, 4 x 2 days, 3 x 2 days,2 x 2 days, 1 x 2 days  -     fluticasone (FLONASE) 50 mcg/act nasal spray; 2 sprays into each nostril daily    Chronic rhinitis  -     predniSONE 10 mg tablet; 5 x 2 days, 4 x 2 days, 3 x 2 days,2 x 2 days, 1 x 2 days  -     fluticasone (FLONASE) 50 mcg/act nasal spray; 2 sprays into each nostril daily    Cough  -     predniSONE 10 mg tablet; 5 x 2 days, 4 x 2 days, 3 x 2 days,2 x 2 days, 1 x 2 days  -     benzonatate (TESSALON) 200 MG capsule; Take 1 capsule (200 mg total) by mouth 3 (three) times a day as needed for cough          Subjective:   Chief Complaint   Patient presents with    Earache     right           Patient ID: Ralph Olszewski is a 48 y o  female  Patient presents to the office complaining of almost 3 weeks of ear pressure, discomfort, runny nose, and coughing worse at night  She was seen by video visit on 02/16 and placed on antibiotics and prednisone for presumed ear infection  This did not really help any of her symptoms  She flew to [place] and her ears got much worse especially on the flight back  States that she has hearing loss at this point and has severe discomfort in both ears  The following portions of the patient's history were reviewed and updated as appropriate: allergies, current medications, past family history, past medical history, past social history, past surgical history and problem list     Review of Systems   Constitutional: Negative for chills and fever  HENT: Positive for congestion, ear pain, hearing loss, postnasal drip and rhinorrhea  Negative for sinus pressure, sinus pain, sore throat, trouble swallowing and voice change  Eyes: Negative for pain and visual disturbance  Respiratory: Positive for cough  Negative for shortness of breath  Cardiovascular: Negative for chest pain and palpitations  Gastrointestinal: Negative for abdominal pain and vomiting  Genitourinary: Negative for dysuria and hematuria  Musculoskeletal: Negative for arthralgias and back pain  Skin: Negative for color change and rash  Neurological: Negative for seizures and syncope  All other systems reviewed and are negative  Objective:      /78   Temp 97 8 °F (36 6 °C)   Ht 5' 7" (1 702 m)   Wt 87 8 kg (193 lb 9 6 oz)   LMP 01/15/2020   BMI 30 32 kg/m²          Physical Exam  Constitutional:       General: She is not in acute distress  Appearance: She is well-developed  HENT:      Head: Normocephalic and atraumatic  Right Ear: A middle ear effusion is present  Tympanic membrane is not injected, scarred, erythematous or bulging  Left Ear: A middle ear effusion is present  Tympanic membrane is not injected, scarred, erythematous or bulging  Nose: Congestion and rhinorrhea present  Mouth/Throat:      Mouth: Mucous membranes are dry  Eyes:      Conjunctiva/sclera: Conjunctivae normal    Cardiovascular:      Rate and Rhythm: Regular rhythm  Heart sounds: Normal heart sounds  Pulmonary:      Effort: Pulmonary effort is normal       Breath sounds: Normal breath sounds  No wheezing or rhonchi  Abdominal:      General: Abdomen is flat  Palpations: Abdomen is soft  Tenderness: There is no abdominal tenderness  Skin:     General: Skin is warm and dry  Capillary Refill: Capillary refill takes less than 2 seconds  Neurological:      General: No focal deficit present  Mental Status: She is alert and oriented to person, place, and time     Psychiatric:         Mood and Affect: Mood normal          Judgment: Judgment normal

## 2022-05-11 ENCOUNTER — OFFICE VISIT (OUTPATIENT)
Dept: FAMILY MEDICINE CLINIC | Facility: CLINIC | Age: 47
End: 2022-05-11

## 2022-05-11 VITALS
WEIGHT: 196.6 LBS | HEIGHT: 67 IN | DIASTOLIC BLOOD PRESSURE: 78 MMHG | SYSTOLIC BLOOD PRESSURE: 122 MMHG | BODY MASS INDEX: 30.86 KG/M2 | TEMPERATURE: 97.7 F

## 2022-05-11 DIAGNOSIS — M77.12 LATERAL EPICONDYLITIS OF LEFT ELBOW: Primary | ICD-10-CM

## 2022-05-11 PROBLEM — M77.02 MEDIAL EPICONDYLITIS OF LEFT ELBOW: Status: ACTIVE | Noted: 2022-05-11

## 2022-05-11 PROCEDURE — 96372 THER/PROPH/DIAG INJ SC/IM: CPT

## 2022-05-11 PROCEDURE — 99213 OFFICE O/P EST LOW 20 MIN: CPT | Performed by: FAMILY MEDICINE

## 2022-05-11 RX ORDER — PREDNISONE 10 MG/1
TABLET ORAL
Qty: 30 TABLET | Refills: 0 | Status: SHIPPED | OUTPATIENT
Start: 2022-05-11

## 2022-05-11 RX ORDER — KETOROLAC TROMETHAMINE 30 MG/ML
30 INJECTION, SOLUTION INTRAMUSCULAR; INTRAVENOUS ONCE
Status: COMPLETED | OUTPATIENT
Start: 2022-05-11 | End: 2022-05-11

## 2022-05-11 RX ADMIN — KETOROLAC TROMETHAMINE 30 MG: 30 INJECTION, SOLUTION INTRAMUSCULAR; INTRAVENOUS at 11:15

## 2022-05-11 NOTE — ASSESSMENT & PLAN NOTE
Shot of tordol now Patient also to take the prednisone as directed If no relief with this will need orthopedic referral

## 2022-05-11 NOTE — PROGRESS NOTES
Assessment/Plan:    Lateral epicondylitis of left elbow  Shot of tordol now Patient also to take the prednisone as directed If no relief with this will need orthopedic referral        There are no diagnoses linked to this encounter  Subjective:   Chief Complaint   Patient presents with    Elbow Pain     Left           Patient ID: Azul Cool is a 48 y o  female  Patient  is here for severe left elbow pain since Friday Patient daughter  bumped her left elbow on Friday She has had severe pain of the elbow since then Patient is unable to lift arm She is unable to bend elbow with out pain She cannot turn the elbow or  wrist wihtout pain She had similar many years ago She is taking 1 naprosyn every 6 hours with no relief    Arm Pain   The incident occurred 3 to 5 days ago  The incident occurred at home  The injury mechanism was a direct blow  The pain is present in the left elbow  The quality of the pain is described as shooting and stabbing  Radiates to: to left forearm  The pain is at a severity of 9/10  The pain is severe  The pain has been constant since the incident  Pertinent negatives include no chest pain, muscle weakness, numbness or tingling  The symptoms are aggravated by movement  She has tried NSAIDs, immobilization, ice and rest for the symptoms  The treatment provided no relief  The following portions of the patient's history were reviewed and updated as appropriate: allergies, current medications, past family history, past medical history, past social history, past surgical history and problem list     Review of Systems   Cardiovascular: Negative for chest pain  Neurological: Negative for tingling and numbness  Objective:      /78   Temp 97 7 °F (36 5 °C)   Ht 5' 7" (1 702 m)   Wt 89 2 kg (196 lb 9 6 oz)   LMP 01/15/2020   BMI 30 79 kg/m²          Physical Exam  Vitals and nursing note reviewed  Constitutional:       Appearance: Normal appearance     Musculoskeletal: Arms:       Comments: Patient has severe point tenderness lateral epicondyle Patient cannot fully extend left  elbow She refuses to attempt any supination or pronation of left elbow She is able to flex at elbow She has no shoulder or neck pain on left She ahs normal hand  but has pain with hand    Neurological:      General: No focal deficit present  Mental Status: She is alert and oriented to person, place, and time  Psychiatric:         Mood and Affect: Mood normal          Behavior: Behavior normal          Thought Content:  Thought content normal          Judgment: Judgment normal

## 2022-09-22 ENCOUNTER — OFFICE VISIT (OUTPATIENT)
Dept: FAMILY MEDICINE CLINIC | Facility: CLINIC | Age: 47
End: 2022-09-22

## 2022-09-22 VITALS
BODY MASS INDEX: 29.73 KG/M2 | TEMPERATURE: 97.2 F | WEIGHT: 189.4 LBS | DIASTOLIC BLOOD PRESSURE: 82 MMHG | HEIGHT: 67 IN | SYSTOLIC BLOOD PRESSURE: 120 MMHG

## 2022-09-22 DIAGNOSIS — F32.1 CURRENT MODERATE EPISODE OF MAJOR DEPRESSIVE DISORDER WITHOUT PRIOR EPISODE (HCC): ICD-10-CM

## 2022-09-22 DIAGNOSIS — F51.05 INSOMNIA DUE TO OTHER MENTAL DISORDER: Primary | ICD-10-CM

## 2022-09-22 DIAGNOSIS — M54.2 ACUTE NECK PAIN: ICD-10-CM

## 2022-09-22 DIAGNOSIS — F99 INSOMNIA DUE TO OTHER MENTAL DISORDER: Primary | ICD-10-CM

## 2022-09-22 PROCEDURE — 99214 OFFICE O/P EST MOD 30 MIN: CPT | Performed by: FAMILY MEDICINE

## 2022-09-22 RX ORDER — NAPROXEN 500 MG/1
500 TABLET ORAL 2 TIMES DAILY WITH MEALS
Qty: 60 TABLET | Refills: 1 | Status: SHIPPED | OUTPATIENT
Start: 2022-09-22

## 2022-09-22 RX ORDER — TRAZODONE HYDROCHLORIDE 50 MG/1
50 TABLET ORAL
Qty: 30 TABLET | Refills: 1 | Status: SHIPPED | OUTPATIENT
Start: 2022-09-22 | End: 2022-10-13 | Stop reason: CLARIF

## 2022-09-22 NOTE — PATIENT INSTRUCTIONS
Depression   AMBULATORY CARE:   Depression  is a medical condition that causes feelings of sadness or hopelessness that do not go away  Depression may cause you to lose interest in things you used to enjoy  These feelings may interfere with your daily life  Common symptoms include the following:   Appetite changes, or weight gain or loss    Trouble going to sleep or staying asleep, or sleeping too much    Fatigue or lack of energy    Feeling restless, irritable, or withdrawn    Feeling worthless, hopeless, discouraged, or guilty    Trouble concentrating, remembering things, doing daily tasks, or making decisions    Thoughts about hurting or killing yourself    Call your local emergency number (911 in the [address] [place] [address] Rd[address]) if:   You think about harming yourself or someone else  You have done something on purpose to hurt yourself  Call your therapist or doctor if:   Your symptoms do not improve  You cannot make it to your next appointment  You have new symptoms  You have questions or concerns about your condition or care  The following resources are available at any time to help you, if needed:   [address] [place] [address] Street: [phone] ([phone]-[facility])     Suicide Hotline: 4-105.828.2960 (7-754-[place])     For a list of international numbers: https://save org/find-help/international-resources/    Treatment for depression  may include medicine to relieve depression  Medicine is often used together with therapy  Therapy is a way for you to talk about your feelings and anything that may be causing depression  Therapy can be done alone or in a group  It may also be done with family members or a significant other  Self-care:   Get regular physical activity  Try to be active for 30 minutes, 3 to 5 days a week  Physical activity can help relieve depression  Work with your healthcare provider to develop a plan that you enjoy  It may help to ask someone to be active with you      Create a regular sleep schedule  A routine can help you relax before bed  Listen to music, read, or do yoga  Try to go to bed and wake up at the same time every day  Sleep is important for emotional health  Eat a variety of healthy foods  Healthy foods include fruits, vegetables, whole-grain breads, low-fat dairy products, lean meats, fish, and cooked beans  A healthy meal plan is low in fat, salt, and added sugar  Do not drink alcohol or use drugs  Alcohol and drugs can make depression worse  Talk to your therapist or doctor if you need help quitting  Follow up with your healthcare provider as directed: Your healthcare provider will monitor your progress at follow-up visits  He or she will also monitor your medicine if you take antidepressants  Your healthcare provider will ask if the medicine is helping  Tell him or her about any side effects or problems you may have with your medicine  The type or amount of medicine may need to be changed  Write down your questions so you remember to ask them during your visits  © Copyright [employer] 2022 Information is for End User's use only and may not be sold, redistributed or otherwise used for commercial purposes  All illustrations and images included in CareNotes® are the copyrighted property of A D A M , Inc  or [facility]   The above information is an  only  It is not intended as medical advice for individual conditions or treatments  Talk to your doctor, nurse or pharmacist before following any medical regimen to see if it is safe and effective for you

## 2022-09-22 NOTE — PROGRESS NOTES
Chief Complaint   Patient presents with    Insomnia     Name: Kristofer Go      : 1975      MRN: [id number]  Encounter Provider: Mira Bloch, DO  Encounter Date: 2022   Encounter department: [facility] PRIMARY CARE    Assessment & Plan     1  Insomnia due to other mental disorder  Assessment & Plan: Will try trazodone 50 mg at bedtime to assist with mood and sleep Patient to followup in 4 weeks     Orders:  -     traZODone (DESYREL) 50 mg tablet; Take 1 tablet (50 mg total) by mouth daily at bedtime  -     Ambulatory Referral to Social Work Care Management Program; Future    2  Current moderate episode of major depressive disorder without prior episode [facility])  Assessment & Plan: Will try trazodone for sleep Patient is interested in counseling Patient contracts for safety I did provide the number for crisis intervention if she does have any suicidal ideation in the future We discussed partial inpatient treatment porgram I will try to get patient into our counselor Jody Reilly Will aslo consults case management to see if they have any resources for patient     Orders:  -     traZODone (DESYREL) 50 mg tablet; Take 1 tablet (50 mg total) by mouth daily at bedtime  -     Ambulatory Referral to Social Work Care Management Program; Future    3  Acute neck pain  Assessment & Plan:  Patient to take naprosyn as directed     Orders:  -     naproxen (Naprosyn) 500 mg tablet;  Take 1 tablet (500 mg total) by mouth 2 (two) times a day with meals         Subjective      Patient has for last 2 weeks been experiencing insomnia anxiety and depression Patient is not sure why She relates a lot of stressors Her  travels for work She has 2 kids under 25 at home plus older daughters and grandkids to take care of Patient also states that her neck is painful on the right side due to "stress" Patient is taking tylenol and tylenol PM Patient states she sleeps for 30 minutes wakes up and cannot fall aspleep Then she gets overwhelmingly anxious Patient states that then her heart races and she has racing thoughts Patient wants to see a counselor She has no insurance She is also noticing she feels depressed at times and like she just wants to be alone and just sleep Patient feels the lack of sleep iin the last 2 weeks is the biggest issue She has not had any active suicidal thoughts However she has thought at times that she would like to "not be here" She denies any active plan She also admits that she knows that is not what she wants      Review of Systems   Constitutional: Positive for fatigue  Respiratory: Negative for shortness of breath  Cardiovascular: Positive for palpitations  Negative for chest pain  Musculoskeletal: Positive for neck pain and neck stiffness  Psychiatric/Behavioral: Positive for dysphoric mood and sleep disturbance  Negative for agitation, behavioral problems, confusion, decreased concentration, hallucinations and self-injury  The patient is nervous/anxious  The patient is not hyperactive  Current Outpatient Medications on File Prior to Visit   Medication Sig    predniSONE 10 mg tablet 4 tablets for 3 days then 3 tablets for 3 days then 2 tablets for 3 days then 1 tablet daily for 3 days       Objective     /82   Temp (!) 97 2 °F (36 2 °C)   Ht 5' 7" (1 702 m)   Wt 85 9 kg (189 lb 6 4 oz)   LMP 01/15/2020   BMI 29 66 kg/m²     Physical Exam  Vitals and nursing note reviewed  Constitutional:       Appearance: Normal appearance  HENT:      Head: Normocephalic  Eyes:      Extraocular Movements: Extraocular movements intact  Conjunctiva/sclera: Conjunctivae normal       Pupils: Pupils are equal, round, and reactive to light  Pulmonary:      Effort: Pulmonary effort is normal    Musculoskeletal:         General: Tenderness present  Comments: Right side of neck   Neurological:      General: No focal deficit present        Mental Status: She is alert and oriented to person, place, and time  Psychiatric:         Behavior: Behavior normal          Thought Content:  Thought content normal          Judgment: Judgment normal       Comments: Mood is depressed        Elton Forget, DO

## 2022-09-22 NOTE — ASSESSMENT & PLAN NOTE
Will try trazodone for sleep Patient is interested in counseling Patient contracts for safety I did provide the number for crisis intervention if she does have any suicidal ideation in the future We discussed partial inpatient treatment porgram I will try to get patient into our counselor Jody Reilly Will aslo consults case management to see if they have any resources for patient

## 2022-09-23 ENCOUNTER — PATIENT OUTREACH (OUTPATIENT)
Dept: FAMILY MEDICINE CLINIC | Facility: CLINIC | Age: 47
End: 2022-09-23

## 2022-09-23 NOTE — PROGRESS NOTES
OP CM called to pt in regards to not having insurance and needing OP MH therapy  Pt can go to the [facility] for assistance with this  Will discuss with pt once she returns phone call: If you have NO insurance for outpatient Mental Health services you will need to have a liability appointment with [facility] to assess you to qualify for funding  [facility] does NOT provide funding for Medications       WALK IN HOURS: Monday, Tuesday, Thursday - 9:00am - 11:00am Wednesday - 9:00am - 11:00am & 1:00pm - 3:00pm Our address: [facility], [address] [place] [address], [place] [id number] (Red brick building diagonally across from the new [place] [address] on [facility]) Our phone number: [phone]     To be seen during walk in hours, you must brin [address] (if you do not have your SS card, then please bring something else with your name and address listed on it )     If you have INCOME and do not have services through medical assistance (which may include food stamps, OB/GYN coverage, etc ) you must also bring:     Proof of income: Current paystub from employer  SSD/SSI letter for the current year - or copy of bank statement Unemployment compensation statement Interest or dividends from banking accounts If your net income is over $13,200, you must also bring:  Doctor/Hospital Bills that are paid or unpaid from within the last year  Pharmacy print out of prescriptions paid for the past year  Child supportsoha tax statement     Sincerely, [facility] Department of [address] [place] [address]

## 2022-09-26 ENCOUNTER — PATIENT OUTREACH (OUTPATIENT)
Dept: FAMILY MEDICINE CLINIC | Facility: CLINIC | Age: 47
End: 2022-09-26

## 2022-09-26 NOTE — PROGRESS NOTES
OP CM called to pt again and left message  Email sent to pt as well with OP MH info through the [facility] for pts without insurance

## 2022-10-05 ENCOUNTER — SOCIAL WORK (OUTPATIENT)
Dept: BEHAVIORAL/MENTAL HEALTH CLINIC | Facility: CLINIC | Age: 47
End: 2022-10-05

## 2022-10-05 DIAGNOSIS — F32.1 CURRENT MODERATE EPISODE OF MAJOR DEPRESSIVE DISORDER WITHOUT PRIOR EPISODE (HCC): Primary | ICD-10-CM

## 2022-10-05 PROCEDURE — 90791 PSYCH DIAGNOSTIC EVALUATION: CPT | Performed by: SOCIAL WORKER

## 2022-10-05 NOTE — PSYCH
Assessment/Plan   Diagnosis:   Patient Active Problem List   Diagnosis   • Migraine without aura and without status migrainosus, not intractable   • Lateral epicondylitis of left elbow   • Acute neck pain   • Insomnia due to other mental disorder   • Current moderate episode of major depressive disorder without prior episode [facility])       Reason for referral: Newly acquired depressive symptoms, starting around May this year after a traumatic incident  She complains of marital stress and has five children (three adults) with two smaller children at home, ages 6 and 15  She reports racing thoughts at night time  Subjective     Patient is a 50 y o  female at birth and presents with major depressive disorder, stresses at home  Primary complaints include:   Psychosocial Stressors: family and marital   Complaints of Pain: none  Functioning Relationships: alone & isolated    Past Psychiatric History:   None  Currently in treatment with primary care doctor  Education: unknown  Other Pertinent History: None  Trauma history: yes  Work history: is unemployed  Medical Issues:     Substance Abuse History:  unknown  Use of Alcohol: unknown      Psychiatric Review Of Systems:  sleep: yes, difficult time falling asleep  appetite changes: no  weight changes: no  energy/anergy: yes, lack of   interest/pleasure/anhedonia: yes, lack of   somatic symptoms: no  anxiety/panic: yes  guilty/hopeless: yes  S I B s/risky behavior: would not answer    Suicidal Ideations? Would not answer   Homicidal Ideations? no     Objective       Mental Status Evaluation:  Appearance:  casually dressed   Behavior:  guarded   Speech:  soft   Mood:  depressed   Affect:  mood-congruent   Thought Process:  normal   Thought Content:  normal   Sensorium:  person, place and time/date   Cognition:  recent and remote memory grossly intact   Insight:  limited   Judgment:  limited     1   Current moderate episode of major depressive disorder without prior episode [facility])         Recommendations: Alea Juarez is referred for outpatient counseling to address her familial stressors, previous trauma and depressive symptoms  She appeared guarded and chose not to give too much information; she appears to lack trust      KIDS: 29, 32, 21, 12 (daughters) and 11y/od boy  20 y/o daughter has 21 month granddaughter

## 2022-10-13 ENCOUNTER — OFFICE VISIT (OUTPATIENT)
Dept: FAMILY MEDICINE CLINIC | Facility: CLINIC | Age: 47
End: 2022-10-13

## 2022-10-13 VITALS
WEIGHT: 190.6 LBS | SYSTOLIC BLOOD PRESSURE: 120 MMHG | DIASTOLIC BLOOD PRESSURE: 78 MMHG | HEIGHT: 67 IN | BODY MASS INDEX: 29.91 KG/M2

## 2022-10-13 DIAGNOSIS — G89.29 CHRONIC BILATERAL LOW BACK PAIN WITH RIGHT-SIDED SCIATICA: ICD-10-CM

## 2022-10-13 DIAGNOSIS — F32.1 CURRENT MODERATE EPISODE OF MAJOR DEPRESSIVE DISORDER WITHOUT PRIOR EPISODE (HCC): Primary | ICD-10-CM

## 2022-10-13 DIAGNOSIS — M54.41 CHRONIC BILATERAL LOW BACK PAIN WITH RIGHT-SIDED SCIATICA: ICD-10-CM

## 2022-10-13 DIAGNOSIS — F51.05 INSOMNIA DUE TO OTHER MENTAL DISORDER: ICD-10-CM

## 2022-10-13 DIAGNOSIS — F99 INSOMNIA DUE TO OTHER MENTAL DISORDER: ICD-10-CM

## 2022-10-13 PROCEDURE — 99213 OFFICE O/P EST LOW 20 MIN: CPT | Performed by: FAMILY MEDICINE

## 2022-10-13 RX ORDER — PREDNISONE 10 MG/1
TABLET ORAL
Qty: 30 TABLET | Refills: 0 | Status: SHIPPED | OUTPATIENT
Start: 2022-10-13

## 2022-10-13 NOTE — ASSESSMENT & PLAN NOTE
Patient has not had formal PT Patient needs to try that I will also repeat lumbar xrays as it has been a few years Pateint will also try the prednisone She took it in May and was fine until a few weeks ago

## 2022-10-13 NOTE — PROGRESS NOTES
Chief Complaint   Patient presents with   • Follow-up     Insomnia      Name: Cecil Kathleen      : 1975      MRN: [id number]  Encounter Provider: Thony rOta DO  Encounter Date: 10/13/2022   Encounter department: [facility] PRIMARY CARE    Assessment & Plan     1  Current moderate episode of major depressive disorder without prior episode [facility])  Assessment & Plan:  Patient feels the counseling is helpful She does not want to take medication at this time so she will continue to monitor the situation      2  Chronic bilateral low back pain with right-sided sciatica  Assessment & Plan:  Patient has not had formal PT Patient needs to try that I will also repeat lumbar xrays as it has been a few years Pateint will also try the prednisone She took it in May and was fine until a few weeks ago    Orders:  -     XR spine lumbar minimum 4 views non injury; Future; Expected date: 10/13/2022  -     Ambulatory Referral to Physical Therapy; Future  -     predniSONE 10 mg tablet; 4 tablets for 3 days then 3 tablets for 3 days then 2 tablets for 3 days then 1 tablet for 3 days    3   Insomnia due to other mental disorder  Assessment & Plan:  Patient is now getting 6 hours of sleep per night on her own and feels well with that            Subjective      Patient is here for follow up of insomnia and depression She aslo needs to discuss her chronic low back pain with right sciatica Pateint is feeling much better mentally She is sleeping about 6 hours on her own She never took the trazodone Patient feels that her issues require only counseling She has had one session so far and found it helpful She is having issues with her daughter's behavior and choices at school Patient also has issues with her adult kids Patient is stressed out about this Patient has had low back pain for some time It waxes and wanes Right now it is right sided sciatica that is troubling her She has not had relief with naprosyn Patient was on prednisone in the past and that did help her  She has not had PT either as the cost is an issue She is not interested in injections in the spine yet Patient has no weakness in legs and no incontinence    Review of Systems   Constitutional: Negative for activity change, appetite change and fatigue  Musculoskeletal: Positive for back pain and neck pain  Negative for gait problem  Skin: Negative for rash  Psychiatric/Behavioral: Negative for decreased concentration, dysphoric mood, self-injury, sleep disturbance and suicidal ideas  The patient is not nervous/anxious  Current Outpatient Medications on File Prior to Visit   Medication Sig   • naproxen (Naprosyn) 500 mg tablet Take 1 tablet (500 mg total) by mouth 2 (two) times a day with meals   • [DISCONTINUED] traZODone (DESYREL) 50 mg tablet Take 1 tablet (50 mg total) by mouth daily at bedtime   • [DISCONTINUED] predniSONE 10 mg tablet 4 tablets for 3 days then 3 tablets for 3 days then 2 tablets for 3 days then 1 tablet daily for 3 days (Patient not taking: Reported on 10/13/2022)       Objective     /78   Ht 5' 7" (1 702 m)   Wt 86 5 kg (190 lb 9 6 oz)   LMP 01/15/2020   BMI 29 85 kg/m²     Physical Exam  Vitals and nursing note reviewed  Constitutional:       Appearance: Normal appearance  Eyes:      Extraocular Movements: Extraocular movements intact  Pupils: Pupils are equal, round, and reactive to light  Skin:     Findings: No rash  Neurological:      General: No focal deficit present  Mental Status: She is alert and oriented to person, place, and time  Psychiatric:         Mood and Affect: Mood normal          Behavior: Behavior normal          Thought Content:  Thought content normal          Judgment: Judgment normal        Blanca Loop, DO

## 2022-10-13 NOTE — PATIENT INSTRUCTIONS
Sciatica   WHAT YOU NEED TO KNOW:   What is sciatica? Sciatica is a condition that causes pain along your sciatic nerve  The sciatic nerve runs from your spine through both sides of your buttocks  It then runs down the back of your thigh, into your lower leg and foot  Any place along your sciatic nerve may be compressed, inflamed, irritated, or stretched and cause symptoms  What causes sciatica? Sciatica may be related to certain activities, poor posture, and physical or psychological stress  Any of the following may cause or increase your risk of sciatica:  Disc problems:  A slipped disc (soft cushion in between the bones of the spine) is the most common cause of sciatica  The disc may press on the sciatic nerve  One bone in your spine may slip over another, or you may have narrowing of the spinal column  Muscle injury: This may happen after you twist or lift a heavy object  Swelling from sprained or irritated muscles in the buttocks, thighs, or legs press on the sciatic nerve  Obesity or pregnancy:  Extra weight increases pressure on your back and legs  Trauma:  Direct blows on the buttocks, thighs, or legs, car accidents, or falls may injure the sciatic nerve  Diseases of the spine:  Arthritis, osteoporosis, cancer, or infection of the spine may also affect the sciatic nerve  What are the signs and symptoms of sciatica? The symptoms of sciatic may be short-term or long-term:  Pain that goes from the lower back into your buttocks and down the back of your thigh    Numbness or tingling in your buttocks and legs    Muscle weakness, difficulty moving or controlling your leg or foot    Leg pain that increases with standing, sitting, or squatting    How is sciatica diagnosed? Your healthcare provider will ask about other health conditions you may have  He may ask you about your job, history of back pain, diseases, or surgeries you have had   He will examine you and move your legs to see what increases pain  You may also need any of the following:  X-rays: This is a picture of the bones and tissues in your back, hip, thigh, or leg  This test may show other problems, such as fractures (broken bones)  CT scan: This test is also called a CAT scan  An x-ray machine uses a computer to take pictures of your hips, thighs, and legs  The pictures may show your sciatic nerve, muscles, and blood vessels  You may be given a dye before the pictures are taken to help healthcare providers see the pictures better  Tell the healthcare provider if you have ever had an allergic reaction to contrast dye  MRI:  This scan uses powerful magnets and a computer to take pictures of your hips, thighs, and legs  An MRI may show damaged nerves, muscles, bones, and blood vessels  You may be given dye to help the pictures show up better  Tell the healthcare provider if you have ever had an allergic reaction to contrast dye  Do not enter the MRI room with anything metal  Metal can cause serious injury  Tell the healthcare provider if you have any metal in or on your body  An electromyography (EMG)  test measures the electrical activity of your muscles at rest and with movement  Nerve conduction tests: These tests check how surface nerves and related muscles respond to stimulation  Electrodes with wires or tiny needles are placed on certain areas, such as the buttocks and legs  How is sciatica treated? NSAIDs:  These medicines decrease swelling and pain  NSAIDs are available without a doctor's order  Ask your healthcare provider which medicine is right for you  Ask how much to take and when to take it  Take as directed  NSAIDs can cause stomach bleeding or kidney problems if not taken correctly  Acetaminophen: This medicine decreases pain  Acetaminophen is available without a doctor's order  Ask how much to take and how often to take it  Follow directions   Acetaminophen can cause liver damage if not taken correctly  Muscle relaxers  help decrease pain and muscle spasms  Epidural steroid medicine: This may include both an anesthetic (numbing medicine) and a steroid, which may decrease swelling and relieve pain  It is given as a shot close to the spine in the area where you have pain  Chemonucleolysis: This is an injection given into the damaged disc to soften or shrink the disc  Surgery: This may be done to correct problems such as a damaged disc, or a tumor in your spine  It may be done to decrease the pressure on the sciatic nerve  Healthcare providers may also release the muscle that may be pressing into your sciatic nerve  How can I help manage sciatica? Ultrasound therapy: This is a machine that uses sound waves to decrease pain  Topical medicines may be added to help decrease pain and inflammation  Physical therapy:  A physical therapist teaches you exercises to help improve movement and strength, and to decrease pain  An occupational therapist teaches you skills to help with your daily activities  Assistive devices: You may need to wear back support, such as a back brace  You may need crutches, a cane, or a walker to decrease stress on your lower back and leg muscles  Ask your healthcare provider for more information about assistive devices and how to use them correctly  How can sciatica be prevented? Avoid pressure on your back and legs:  Do not  lift heavy objects, or stand or sit for long periods of time  Lift objects safely:  Keep your back straight and bend your knees when you  an object  Do not bend or twist your back when you lift  Maintain a healthy weight:  Ask your healthcare provider how much you should weigh  Ask him to help you create a weight loss plan if you are overweight  Exercise:  Ask your healthcare provider about the best stretching, warmup, and exercise plan for you  What are the risks of sciatica?   An epidural steroid injection can lead to pain disorders or paralysis if it is placed incorrectly  It may also cause headaches, leg pain, and blockage of blood flow to the spinal cord  Surgery may cause you to bleed or get an infection  If not treated, your muscles and nerves may become damaged permanently  You may have decreased strength  You may not be able to move your leg or control when you urinate or have bowel movements  When should I contact my healthcare provider? You have pain in your lower back at night or when resting  You have pain in your lower back with numbness below the knee  You have weakness in one leg only  You have questions or concerns about your condition or care  When should I seek immediate care or call 911? You have trouble holding back your urine or bowel movements  You have weakness in both legs  You have numbness in your groin or buttocks  CARE AGREEMENT:   You have the right to help plan your care  Learn about your health condition and how it may be treated  Discuss treatment options with your healthcare providers to decide what care you want to receive  You always have the right to refuse treatment  The above information is an  only  It is not intended as medical advice for individual conditions or treatments  Talk to your doctor, nurse or pharmacist before following any medical regimen to see if it is safe and effective for you  © Copyright [employer] 2022 Information is for End User's use only and may not be sold, redistributed or otherwise used for commercial purposes   All illustrations and images included in CareNotes® are the copyrighted property of [employer]  or [address] [place] [address]"[employer]pape

## 2022-10-13 NOTE — ASSESSMENT & PLAN NOTE
Patient feels the counseling is helpful She does not want to take medication at this time so she will continue to monitor the situation

## 2023-02-02 ENCOUNTER — OFFICE VISIT (OUTPATIENT)
Dept: FAMILY MEDICINE CLINIC | Facility: CLINIC | Age: 48
End: 2023-02-02

## 2023-02-02 VITALS
HEIGHT: 67 IN | SYSTOLIC BLOOD PRESSURE: 110 MMHG | TEMPERATURE: 97.2 F | WEIGHT: 190.2 LBS | DIASTOLIC BLOOD PRESSURE: 72 MMHG | BODY MASS INDEX: 29.85 KG/M2

## 2023-02-02 DIAGNOSIS — H65.01 NON-RECURRENT ACUTE SEROUS OTITIS MEDIA OF RIGHT EAR: Primary | ICD-10-CM

## 2023-02-02 PROBLEM — M54.2 ACUTE NECK PAIN: Status: RESOLVED | Noted: 2022-09-22 | Resolved: 2023-02-02

## 2023-02-02 PROBLEM — G89.29 CHRONIC BILATERAL LOW BACK PAIN WITH RIGHT-SIDED SCIATICA: Status: RESOLVED | Noted: 2022-10-13 | Resolved: 2023-02-02

## 2023-02-02 PROBLEM — M54.41 CHRONIC BILATERAL LOW BACK PAIN WITH RIGHT-SIDED SCIATICA: Status: RESOLVED | Noted: 2022-10-13 | Resolved: 2023-02-02

## 2023-02-02 PROBLEM — M77.12 LATERAL EPICONDYLITIS OF LEFT ELBOW: Status: RESOLVED | Noted: 2022-05-11 | Resolved: 2023-02-02

## 2023-02-02 RX ORDER — CEFDINIR 300 MG/1
300 CAPSULE ORAL EVERY 12 HOURS SCHEDULED
Qty: 20 CAPSULE | Refills: 0 | Status: SHIPPED | OUTPATIENT
Start: 2023-02-02 | End: 2023-02-12

## 2023-02-02 RX ORDER — PREDNISONE 10 MG/1
TABLET ORAL
Qty: 15 TABLET | Refills: 0 | Status: SHIPPED | OUTPATIENT
Start: 2023-02-02

## 2023-02-02 NOTE — PROGRESS NOTES
Chief Complaint   Patient presents with   • Earache   • Headache   • Sore Throat     X  2 days     Name: Petra Morel      : 1975      MRN: [id number]  Encounter Provider: Madelyn Newberry DO  Encounter Date: 2023   Encounter department: [facility]    Assessment & Plan     1  Non-recurrent acute serous otitis media of right ear  Comments:  Take the medication as directed, call the office if symptoms do not improve  Orders:  -     predniSONE 10 mg tablet; 5 x 1 day, 4 x1 day, 3 x 1 day,2 x1 day,  1 x 1 day  -     cefdinir (OMNICEF) 300 mg capsule; Take 1 capsule (300 mg total) by mouth every 12 (twelve) hours for 10 days           Subjective      She presents to the office with a 2-day history of sore throat, headache, ear pain, and not feeling well  Denies fever chills, sinus congestion, runny nose, or productive cough  Review of Systems   Constitutional: Negative for activity change, appetite change, chills, fatigue, fever and unexpected weight change  HENT: Positive for ear pain and sore throat  Negative for postnasal drip, rhinorrhea, sinus pressure and sinus pain  Eyes: Negative  Negative for pain and visual disturbance  Respiratory: Negative for cough and shortness of breath  Cardiovascular: Negative for chest pain and palpitations  Gastrointestinal: Negative for abdominal pain and vomiting  Genitourinary: Negative for dysuria and hematuria  Musculoskeletal: Negative for arthralgias and back pain  Skin: Negative for color change and rash  Neurological: Negative for seizures and syncope  Hematological: Negative  Psychiatric/Behavioral: Negative  All other systems reviewed and are negative        Current Outpatient Medications on File Prior to Visit   Medication Sig   • naproxen (Naprosyn) 500 mg tablet Take 1 tablet (500 mg total) by mouth 2 (two) times a day with meals   • [DISCONTINUED] predniSONE 10 mg tablet 4 tablets for 3 days then 3 tablets for 3 days then 2 tablets for 3 days then 1 tablet for 3 days (Patient not taking: Reported on 2/2/2023)       Objective     /72   Temp (!) 97 2 °F (36 2 °C)   Ht 5' 7" (1 702 m)   Wt 86 3 kg (190 lb 3 2 oz)   LMP 01/15/2020   BMI 29 79 kg/m²     Physical Exam  Constitutional:       General: She is not in acute distress  Appearance: She is well-developed  HENT:      Head: Normocephalic and atraumatic  Right Ear: Swelling and tenderness present  A middle ear effusion is present  Nose: No congestion or rhinorrhea  Mouth/Throat:      Mouth: Mucous membranes are moist    Eyes:      Conjunctiva/sclera: Conjunctivae normal    Cardiovascular:      Rate and Rhythm: Regular rhythm  Heart sounds: Normal heart sounds  Pulmonary:      Effort: Pulmonary effort is normal       Breath sounds: Normal breath sounds  Abdominal:      General: Bowel sounds are normal       Palpations: Abdomen is soft  Lymphadenopathy:      Cervical: No cervical adenopathy  Skin:     General: Skin is warm  Neurological:      General: No focal deficit present  Mental Status: She is alert and oriented to person, place, and time     Psychiatric:         Mood and Affect: Mood normal          Judgment: Judgment normal        Salvatore Kang DO

## 2023-02-24 NOTE — ED PROVIDER NOTES
History  Chief Complaint   Patient presents with    Blood in Urine       History provided by:  Patient  Pelvic Pain   Location:  Suprapubic pain, radiates to low back  Quality:  Pressure, urgency, blood tinged urine on toilet paper,   Severity:  Moderate  Onset quality:  Gradual  Duration:  1 day  Timing:  Constant  Progression:  Worsening  Chronicity:  New  Context:  Similar to previous UTI, but she has not typically experienced hematuria, and this is more pressure and urgency  Relieved by:  None tried  Associated symptoms: no abdominal pain, no chest pain, no cough, no diarrhea, no fever, no headaches, no nausea, no rash, no shortness of breath, no sore throat, no vomiting and no wheezing        Prior to Admission Medications   Prescriptions Last Dose Informant Patient Reported? Taking?   naproxen (NAPROSYN) 375 mg tablet   No No   Sig: Take 1 tablet by mouth every 8 (eight) hours as needed for mild pain for up to 10 days      Facility-Administered Medications: None       Past Medical History:   Diagnosis Date    No known health problems        Past Surgical History:   Procedure Laterality Date    NO PAST SURGERIES         History reviewed  No pertinent family history  I have reviewed and agree with the history as documented  Social History   Substance Use Topics    Smoking status: Never Smoker    Smokeless tobacco: Never Used    Alcohol use No        Review of Systems   Constitutional: Negative for appetite change, chills and fever  HENT: Negative for sore throat  Respiratory: Negative for cough, shortness of breath and wheezing  Cardiovascular: Negative for chest pain and palpitations  Gastrointestinal: Negative for abdominal pain, diarrhea, nausea and vomiting  Genitourinary: Positive for frequency, hematuria and pelvic pain  Negative for dysuria  Musculoskeletal: Positive for back pain  Negative for neck pain  Skin: Negative for rash     Neurological: Negative for dizziness, weakness Patient Name:   Bonita Mercedes     YOB: 1949    Chief Complaint:  Lumbar epidural followup. History of Chief Complaint:  Geremias Ho presents today. The last epidural gave her two months of 100% relief. However, all of her pain has come back throughout her back, her right buttock, her right lower extremity down to her foot. The left side is still doing well. She has severe neural foraminal stenosis at L4-5 and over the lateral recess so there was evident compression of the L4 and the L5 nerve root. She does have notable neural foraminal compression at L5-S1 in the neural foramen. No obvious pressure over the S1 root on the right. We have discussed an L4 through S1 revision decompressive lumbar laminectomy with medial facetectomies and foraminotomies, L4-5 right, L5-S1 right to decompress fully the L4 and the L5 nerve roots. She would not require any decompression over the left-sided nerve roots. She has been given the risks and benefits of the procedure. We did not expect any type of stabilization procedure. Past Medical/Surgical History:    Disease/Disorder Date Side Surgery Date Side Comment   Diabetes         Hypercholesterolemia         Hypertension         Thyroid disease            Discectomy, lumbar 03/15/2021  [facility] 10/11/2022 -L4-S1      Hysterectomy 1981        Tonsillectomy 1968       Allergies:  No known allergies.   Ingredient Reaction Medication Name Comment   NO KNOWN ALLERGIES        Current Medications:    Medication Directions   citalopram 40 mg tablet    gabapentin 300 mg capsule    glimepiride 4 mg tablet    hydrochlorothiazide 50 mg tablet    Januvia 50 mg tablet    levothyroxine 75 mcg tablet    lovastatin 40 mg tablet    metformin 850 mg tablet    [place] 81 mg capsule take 1 capsule by oral route  every day   zolpidem 10 mg tablet      Social History:    SMOKING  Status Tobacco Type Units Per Day Yrs Used   Former smoker Cigarette 1.00 40.00     ALCOHOL  There is no and headaches  Psychiatric/Behavioral: Negative for suicidal ideas  All other systems reviewed and are negative  Physical Exam  ED Triage Vitals [04/09/18 0939]   Temperature Pulse Respirations Blood Pressure SpO2   97 8 °F (36 6 °C) 74 16 127/69 98 %      Temp Source Heart Rate Source Patient Position - Orthostatic VS BP Location FiO2 (%)   Oral -- Sitting Left arm --      Pain Score       7           Orthostatic Vital Signs  Vitals:    04/09/18 0939   BP: 127/69   Pulse: 74   Patient Position - Orthostatic VS: Sitting       Physical Exam   Constitutional: She is oriented to person, place, and time  She appears well-developed and well-nourished  No distress  HENT:   Head: Normocephalic and atraumatic  Right Ear: External ear normal    Left Ear: External ear normal    Nose: Nose normal    Eyes: Conjunctivae and EOM are normal  Pupils are equal, round, and reactive to light  Neck: Normal range of motion  Neck supple  Cardiovascular: Normal rate and regular rhythm  Pulmonary/Chest: Effort normal    Abdominal: Soft  There is tenderness in the suprapubic area  Musculoskeletal: Normal range of motion  Lumbar back: She exhibits pain  She exhibits normal range of motion and no tenderness  Neurological: She is alert and oriented to person, place, and time  She has normal strength  Gait normal    Skin: Skin is warm and dry  No rash noted  She is not diaphoretic  Psychiatric: She has a normal mood and affect  Her behavior is normal  Judgment and thought content normal    Nursing note and vitals reviewed        ED Medications  Medications   phenazopyridine (PYRIDIUM) tablet 100 mg (100 mg Oral Given 4/9/18 1004)   ibuprofen (MOTRIN) tablet 600 mg (600 mg Oral Given 4/9/18 1004)   cephalexin (KEFLEX) capsule 500 mg (500 mg Oral Given 4/9/18 1037)       Diagnostic Studies  Results Reviewed     Procedure Component Value Units Date/Time    POCT urinalysis dipstick [[id number]]  (Abnormal) Resulted: history of alcohol use. Family History:    Disease Detail Family Member Age Cause of Death Comments   Cancer Mother  N    Heart disease Father  N      Review of Systems:   GENERAL:  Patient has no signs of fever, chills or weight change. HEAD/ENTM:  Patient has no signs of headaches, dizziness, hearing loss, ringing in ears, sore throat/hoarseness, recent cold, double vision, blurred vision, itchy eyes, eye redness or eye discharge. NEUROLOGIC: Patient presents with numbness/tingling. CARDIOVASCULAR:  Patient has no signs of chest pain, palpitations, rheumatic fever or heart murmur. RESPIRATORY:  Patient has no signs of chronic cough, wheezing, difficulty breathing, pain on breathing or shortness of breath. GASTROINTESTINAL:  Patient has no signs of nausea/vomiting, difficulty swallowing, gas/bloating, indigestion, abdominal pain, diarrhea, bloody stools or hemorrhoids. GENITOURINARY:  Patient has no signs of blood in urine, painful urinating, burning sensation, bladder/kidney infection, frequent urinating or incontinence. MUSCULOSKELETAL: Patient presents with joint stiffness and joint pain. Patient has no signs of fracture/dislocation, sprain/strain, tendonitis, rheumatoid disease, gout or swelling in feet. INTEGUMENTARY:  Patient has no signs of rash/itching, psoriasis, Raynaud's phenomenon or varicose veins. EMOTIONAL:  Patient has no signs of anxiety, depression, bipolar disorder, memory loss or change in mood. Vitals:  Date BP Pulse Temp (F) Resp. (per min.) Height (Total in.) Weight (lbs.) BMI   01/31/2023     62.00 175.00 32.01   10/05/2022     62.00  30.18     Physical Examination:    General:  Patient appears healthy and comfortable with normal developmental signs/age. Cardiovascular:  No significant swelling or edema was noted on inspection and palpation of bilateral upper and lower extremities.   Temperature and extremity arterial pulses were normal in bilateral upper and lower 04/09/18 1038    Lab Status:  Final result Updated:  04/09/18 1038    Urine Microscopic [[id number]]  (Abnormal) Collected:  04/09/18 0948    Lab Status:  Final result Specimen:  Urine from Urine, Clean Catch Updated:  04/09/18 1012     RBC, UA Innumerable (A) /hpf      WBC, UA Innumerable (A) /hpf      Epithelial Cells Moderate (A) /hpf      Bacteria, UA Innumerable (A) /hpf     Urine culture [[id number]] Collected:  04/09/18 0948    Lab Status: In process Specimen:  Urine from Urine, Clean Catch Updated:  04/09/18 1012    POCT pregnancy, urine [[id number]]  (Normal) Resulted:  04/09/18 1003    Lab Status:  Final result Updated:  04/09/18 1003     EXT PREG TEST UR (Ref: Negative) negative    ED Urine Macroscopic [[id number]]  (Abnormal) Collected:  04/09/18 0948    Lab Status:  Final result Specimen:  Urine Updated:  04/09/18 0949     Color, UA Yellow     Clarity, UA Cloudy     pH, UA 6 5     Leukocytes, UA Large (A)     Nitrite, UA Negative     Protein,  (2+) (A) mg/dl      Glucose, UA Negative mg/dl      Ketones, UA Negative mg/dl      Urobilinogen, UA 0 2 E U /dl      Bilirubin, UA Negative     Blood, UA Large (A)     Specific [place], UA 1 025    Narrative:       CLINITEK RESULT                 No orders to display              Procedures  Procedures       Phone Contacts  ED Phone Contact    ED Course  ED Course                                MDM  CritCare Time    Disposition  Final diagnoses:   Acute cystitis   Hematuria     Time reflects when diagnosis was documented in both MDM as applicable and the Disposition within this note     Time User Action Codes Description Comment    4/9/2018 10:50 AM Dallas SULLIVAN Add [N30 00] Acute cystitis     4/9/2018 10:50 AM Mynor Joaquin Add [R31 9] Hematuria       ED Disposition     ED Disposition Condition Comment    Discharge  Tate Johnson discharge to home/self care      Condition at discharge: Good        Follow-up Information     Follow up With Specialties limbs.  Lymphatic: There was no palpable lymphadenopathy noted on manual evaluation of the neck, axilla, and lower extremity lymph glands. Skin:  Head, neck, trunk, and all four limbs did not reveal abnormal scars or lesions. No contusions, rashes, ulcers, or vascular markings were noted. Eyes: Pupils were noted to be equal, round, and reactive with no evidence of narcotism. Psych: The patient was alert and oriented x3. Affect and mood are normal without signs of anxiety or depression. Musculoskeletal/Neurological:  A full examination of the musculoskeletal organ system was performed and demonstrated full range of motion of all joints of the lumbar spine in flexion, extension, side bending, and rotation and in the bilateral upper and lower extremities without instability or subluxation. No abnormalities of the bones, muscles, or tendons were noted in the above areas. Examination of gait and station demonstrated minimal antalgia. Inspection and percussion of the lumbar spine demonstrated no misalignment, asymmetry, crepitation, or masses. Pulses were graded at 2+ in all extremities. Deep tendon reflexes were graded at 2/4 in the patellae and Achilles bilaterally. Muscular evaluation of the hip flexors (L2 and L3), quadriceps and tibialis anterior (L4), extensor hallucis longus (L5), and gastrocnemius/soleus (S1) demonstrated no evident weakness and were 5/5 bilaterally. Ramonita and Spurling tests were negative. The patient was able to heel walk and toe walk without weakness, instability, or abnormality.  strength was equal bilaterally. The patient had negative signs of lower extremity compressive or peripheral neuropathy. There was no tenderness to palpation over the paraspinal muscles of the spine or bony prominences with evident normal tone. There was no calf tenderness. Straight leg raise and femoral nerve stretch tests were negative.   There was no tenderness to direct palpation over the Details Why Contact Info    Roberta Stevens DO Family Medicine Schedule an appointment as soon as possible for a visit For followup [address] [facility]   [phone]          Patient's Medications   Discharge Prescriptions    CEPHALEXIN (KEFLEX) 500 MG CAPSULE    Take 1 capsule (500 mg total) by mouth 2 (two) times a day for 3 days       Start Date: 4/9/2018  End Date: 4/12/2018       Order Dose: 500 mg       Quantity: 6 capsule    Refills: 0    IBUPROFEN (MOTRIN) 600 MG TABLET    Take 1 tablet (600 mg total) by mouth every 6 (six) hours as needed for mild pain or moderate pain       Start Date: 4/9/2018  End Date: --       Order Dose: 600 mg       Quantity: 30 tablet    Refills: 0    PHENAZOPYRIDINE (PYRIDIUM) 200 MG TABLET    Take 1 tablet (200 mg total) by mouth 3 (three) times a day       Start Date: 4/9/2018  End Date: --       Order Dose: 200 mg       Quantity: 5 tablet    Refills: 0     No discharge procedures on file      ED Provider  Electronically Signed by           Ray Henson MD  04/09/18 7418 sacroiliac joint, and pain was not reproduced with cross-leg testing. No pain was reproduced with internal and external rotation of the hip joint or with direct palpation over the greater trochanter. Leg lengths were equal.  Clonus was negative, and Babinski was downgoing. Coordination with rapid movement in the upper and lower extremities revealed no abnormality. Sensation was intact and equal in all dermatomes of the upper and lower extremities. Tenderness over the lumbosacral junction and right gluteus. No pain with range of motion of the hips. Radiograph Examination:    An AP view of the pelvis was obtained and interpreted in the office today and reveals no periosteal reaction, no medullary lesions, no osteopenia, well-aligned joint spaces, no chondrolysis, and no fractures. AP, lateral, bilateral oblique, flexion and extension views of the lumbar spine were obtained and interpreted in the office today and reveal lumbar degenerative disc disease, multilevel, with laminectomy L4 to S1. Assessment:   1. Lumbar degenerative disc disease, multilevel. 2.  Lumbar spinal stenosis, multilevel. 3.  Lumbar spondylosis with radiculopathy, right. 4.  History of lumbar laminectomy, L4 through S1.   5.  Failure of conservative care including spinal epidurals. Recommendation:   All the conditions assessed above were discussed, treated and affected the patient's care at today's visit. At this time, the spinal epidurals work quite well for the patient, however, the relief is not long lasting. We are going to move forward with an L4 through S1 revision decompressive lumbar laminectomy with right-sided medial facetectomies and foraminotomies, L4-5, L5-S1 to fully decompress the L4 and the L5 nerve roots. We will see the patient back in the office two weeks postoperatively. The risks versus the benefits as well as the alternatives were fully explained to the patient.   Risks include, but are not limited to, paralysis, death, heart attack, stroke, pulmonary embolism, deep vein thrombosis, infection, failure to relieve pain, increase in back or leg pain, reherniation of disc material, need for revision surgery, scarring, spinal fluid leak, bowel or bladder dysfunction, disease transmission, chronic graft site pain, instrumentation failure, pseudarthrosis, and the need for chronic ambulatory assist devices. The patient states full understanding of the risks and benefits and wishes to proceed.

## 2023-08-07 ENCOUNTER — TELEPHONE (OUTPATIENT)
Dept: FAMILY MEDICINE CLINIC | Facility: CLINIC | Age: 48
End: 2023-08-07

## 2023-08-07 DIAGNOSIS — M72.2 PLANTAR FASCIITIS OF LEFT FOOT: Primary | ICD-10-CM

## 2023-08-07 RX ORDER — PREDNISONE 10 MG/1
TABLET ORAL
Qty: 30 TABLET | Refills: 0 | Status: SHIPPED | OUTPATIENT
Start: 2023-08-07

## 2023-12-08 ENCOUNTER — TELEPHONE (OUTPATIENT)
Dept: FAMILY MEDICINE CLINIC | Facility: CLINIC | Age: 48
End: 2023-12-08

## 2023-12-08 NOTE — TELEPHONE ENCOUNTER
Patient is having an episode of sciatica it started at the beginning of the week, but now she's to the point that she is barely able to walk, it is very painful. She stated Dr. Delfin Torrez had prescribed something for her previously, I asked if it was for pain and she said she didn't know. She is asking for medication for the sciatica to [facility] on [address] in [facility]. Please advise patient at [phone].

## 2023-12-11 DIAGNOSIS — M72.2 PLANTAR FASCIITIS OF LEFT FOOT: ICD-10-CM

## 2023-12-11 RX ORDER — PREDNISONE 10 MG/1
TABLET ORAL
Qty: 30 TABLET | Refills: 0 | Status: SHIPPED | OUTPATIENT
Start: 2023-12-11

## 2024-03-04 ENCOUNTER — OFFICE VISIT (OUTPATIENT)
Dept: FAMILY MEDICINE CLINIC | Facility: CLINIC | Age: 49
End: 2024-03-04

## 2024-03-04 VITALS
DIASTOLIC BLOOD PRESSURE: 78 MMHG | BODY MASS INDEX: 27.01 KG/M2 | WEIGHT: 178.2 LBS | HEIGHT: 68 IN | SYSTOLIC BLOOD PRESSURE: 118 MMHG

## 2024-03-04 DIAGNOSIS — M54.40 BACK PAIN OF LUMBAR REGION WITH SCIATICA: Primary | ICD-10-CM

## 2024-03-04 DIAGNOSIS — M54.31 SCIATICA OF RIGHT SIDE: ICD-10-CM

## 2024-03-04 PROCEDURE — 99213 OFFICE O/P EST LOW 20 MIN: CPT | Performed by: FAMILY MEDICINE

## 2024-03-04 RX ORDER — PREDNISONE 10 MG/1
TABLET ORAL
Qty: 30 TABLET | Refills: 0 | Status: SHIPPED | OUTPATIENT
Start: 2024-03-04

## 2024-03-04 NOTE — PROGRESS NOTES
"Chief Complaint   Patient presents with    Leg Pain     Right leg pain x 1 wk.      Name: Selene Garrison      : 1975      MRN: [id number]  Encounter Provider: Fabienne Cabral DO  Encounter Date: 3/4/2024   Encounter department: [facility] PRIMARY CARE    Assessment & Plan     1. Back pain of lumbar region with sciatica  -     XR spine lumbar minimum 4 views non injury; Future; Expected date: 2024  -     Ambulatory referral to Spine & Pain Management; Future  -     Ambulatory Referral to Physical Therapy; Future    2. Sciatica of right side  Assessment & Plan:  Check xray Patient also to take the prednisone as directed Will need to refer patient to spine and pain management Patient also to have PT    Orders:  -     predniSONE 10 mg tablet; 5 x 2 days, 4 x 2 days, 3 x 2 days,2 x 2 days, 1 x 2 days.           Subjective      Patient has history  of chronic low back pain with right sciatica Patient is noting that now the pain in the back is getting worse and she has constant tingling in the right leg Patient notes the pain and tingling wake her from sleep Patient ahs no weakness in the leg Patient has some back pain However the tingling is what is bothering her Patient has no bowel or bladder issues She has not had xrays in several years She was hesitant to do so as she has no insurance       Review of Systems   Musculoskeletal:  Positive for back pain.   Skin:  Negative for rash.   Neurological:  Negative for weakness.       Current Outpatient Medications on File Prior to Visit   Medication Sig    naproxen (Naprosyn) 500 mg tablet Take 1 tablet (500 mg total) by mouth 2 (two) times a day with meals (Patient not taking: Reported on 3/4/2024)    [DISCONTINUED] predniSONE 10 mg tablet 5 x 2 days, 4 x 2 days, 3 x 2 days,2 x 2 days, 1 x 2 days. (Patient not taking: Reported on 3/4/2024)       Objective     /78   Ht 5' 7.5\" (1.715 m)   Wt 80.8 kg (178 lb 3.2 oz)   LMP 01/15/2020   BMI " 27.50 kg/m²     Physical Exam  Vitals and nursing note reviewed.   Constitutional:       Appearance: Normal appearance.   Musculoskeletal:      Comments: Right PSIS pain to palpation Patient with full ROM however has pain with rotation to the left Patient has normal gait Patient has positive right straight leg raising test   Skin:     Findings: No rash.   Neurological:      General: No focal deficit present.      Mental Status: She is alert and oriented to person, place, and time.   Psychiatric:         Mood and Affect: Mood normal.         Behavior: Behavior normal.       Fabienne Cabral, DO

## 2024-03-04 NOTE — ASSESSMENT & PLAN NOTE
Check xray Patient also to take the prednisone as directed Will need to refer patient to spine and pain management Patient also to have PT

## 2024-03-04 NOTE — PATIENT INSTRUCTIONS
Sciatica   AMBULATORY CARE:   Sciatica  is a condition that causes pain along your sciatic nerve. The sciatic nerve runs from your spine through both sides of your buttocks. It then runs down the back of your thigh, into your lower leg and foot. Any place along your sciatic nerve may be compressed, inflamed, irritated, or stretched.       Signs and symptoms of sciatica  may be short-term or long-term:  Pain that goes from the lower back into your buttocks and down the back of your thigh    Numbness or tingling in your buttocks and legs    Muscle weakness, difficulty moving or controlling your leg or foot    Leg pain that increases with standing, sitting, or squatting    Seek immediate care if:   You have trouble controlling your urine or bowel movements.    You have weakness in both legs.    You have numbness in your groin or buttocks.    Call your doctor if:   You have pain in your lower back at night or when resting.    You have pain in your lower back with numbness below the knee.    You have weakness in one leg only.    You have questions or concerns about your condition or care.    Treatment  may include any of the following:  NSAIDs , such as ibuprofen, help decrease swelling, pain, and fever. This medicine is available with or without a doctor's order. NSAIDs can cause stomach bleeding or kidney problems in certain people. If you take blood thinner medicine, always ask your healthcare provider if NSAIDs are safe for you. Always read the medicine label and follow directions.    Acetaminophen  decreases pain and fever. It is available without a doctor's order. Ask how much to take and how often to take it. Follow directions. Read the labels of all other medicines you are using to see if they also contain acetaminophen, or ask your doctor or pharmacist. Acetaminophen can cause liver damage if not taken correctly.    Muscle relaxers  help decrease pain and muscle spasms.    Ultrasound therapy  is a machine that  uses sound waves to decrease pain. Topical medicines may be added to help decrease pain and inflammation.    Epidural steroid medicine  may include both an anesthetic (numbing medicine) and a steroid. A steroid may decrease swelling and relieve pain. It is given as a shot close to the spine in the area where you have pain.    Chemonucleolysis  is an injection given into the damaged disc to soften or shrink the disc.    Surgery  may be done to correct problems such as a damaged disc, or a tumor in your spine. Surgery may be done to decrease the pressure on the sciatic nerve. Healthcare providers may also release the muscle that may be pressing into your sciatic nerve.    Manage your symptoms:   Decrease your activity as directed.  Do not lift heavy objects or twist your back for at least 6 weeks. Slowly return to your usual activity.    Apply ice to help decrease swelling and pain.  Use an ice pack, or put crushed ice in a plastic bag. Cover it with a towel before you place it on your back or leg. Apply ice for 15 to 20 minutes every hour, or as directed.    Apply heat to help decrease pain and muscle spasms.  Use a heat pack or a heating pad set on low heat. Apply heat on your back or leg for 20 to 30 minutes every 2 hours for as many days as directed.    Go to physical or occupational therapy as directed.  A physical therapist teaches you exercises to help improve movement and strength, and to decrease pain. An occupational therapist teaches you skills to help with your daily activities.    Use assistive devices, if directed.  You may need to wear back support, such as a back brace. You may need crutches, a cane, or a walker to decrease stress on your lower back and leg muscles. Ask your healthcare provider for more information about assistive devices and how to use them correctly.    Prevent sciatica:   Avoid pressure on your back and legs.  Do not  lift heavy objects, or stand or sit for long periods of  time.    Lift objects safely.  Keep your back straight and bend your knees when you  an object. Do not bend or twist your back when you lift.         Maintain a healthy weight.  Ask your healthcare provider what a healthy weight is for you. Your provider can help you create a weight loss plan, if needed.    Exercise as directed.  Ask your healthcare provider about the best stretching, warmup, and exercise plan for you.    Follow up with your doctor as directed:  Write down your questions so you remember to ask them during your visits.  © Copyright [employer] 2023 Information is for End User's use only and may not be sold, redistributed or otherwise used for commercial purposes.  The above information is an  only. It is not intended as medical advice for individual conditions or treatments. Talk to your doctor, nurse or pharmacist before following any medical regimen to see if it is safe and effective for you.

## 2024-03-06 ENCOUNTER — APPOINTMENT (OUTPATIENT)
Dept: RADIOLOGY | Facility: MEDICAL CENTER | Age: 49
End: 2024-03-06

## 2024-03-06 DIAGNOSIS — M54.40 BACK PAIN OF LUMBAR REGION WITH SCIATICA: ICD-10-CM

## 2024-03-06 PROCEDURE — 72110 X-RAY EXAM L-2 SPINE 4/>VWS: CPT

## 2024-08-12 NOTE — PROGRESS NOTES
Assessment/Plan:     Diagnoses and all orders for this visit:    Bacterial vaginosis  -     metroNIDAZOLE (FLAGYL) 500 mg tablet; Take 1 tablet (500 mg total) by mouth every 12 (twelve) hours for 7 days  -     POCT wet mount    Pelvic pain  -     US pelvis complete non OB; Future    Acute midline low back pain with right-sided sciatica  -     US pelvis complete non OB; Future        Plan  Use Metronidazole as directed  Make appointment for pelvic ultrasound  Keep appointment for Annual GYN exam and PAP  Pt verbalized understanding of all discussed  Subjective:      Patient ID: Burgess Go is a 53 y o  female  HPI  Pt states she has noted what feels like menstrual cramps, backache and vaginal odor for several days  Pt went to her family doctor and was given antibiotics for a UTI  Pt states when results of the urine culture came back  They were negative and she was told to stop her antibiotic  Pt states she was given naproxen but still has back pain and menstrual cramps  Pt states she has not had a PAP or GYN care in 7-8 years, pt plans to schedule through [facility]  The following portions of the patient's history were reviewed and updated as appropriate: allergies, current medications, past family history, past medical history, past social history, past surgical history and problem list     Review of Systems      Pertinent items are note in the HPI    Objective:      /89   Pulse 86   Wt 86 7 kg (191 lb 3 2 oz)   LMP 10/01/2019   Breastfeeding?  No   BMI 29 95 kg/m²          Physical Exam    Alert and oriented  Denies pain  Vulva negative lesions or erythema  Vagina positive thin gray/white discharge  Cervix negative lseions, positive thin gray discharge  Wet mount was positive for BV no

## 2024-12-16 ENCOUNTER — TELEPHONE (OUTPATIENT)
Dept: FAMILY MEDICINE CLINIC | Facility: CLINIC | Age: 49
End: 2024-12-16

## 2024-12-17 ENCOUNTER — OFFICE VISIT (OUTPATIENT)
Dept: FAMILY MEDICINE CLINIC | Facility: CLINIC | Age: 49
End: 2024-12-17

## 2024-12-17 VITALS
BODY MASS INDEX: 23.79 KG/M2 | HEART RATE: 64 BPM | DIASTOLIC BLOOD PRESSURE: 80 MMHG | OXYGEN SATURATION: 99 % | SYSTOLIC BLOOD PRESSURE: 124 MMHG | HEIGHT: 68 IN | WEIGHT: 157 LBS | TEMPERATURE: 97.7 F

## 2024-12-17 DIAGNOSIS — M54.40 BACK PAIN OF LUMBAR REGION WITH SCIATICA: Primary | ICD-10-CM

## 2024-12-17 DIAGNOSIS — M54.31 SCIATICA OF RIGHT SIDE: ICD-10-CM

## 2024-12-17 PROCEDURE — 99213 OFFICE O/P EST LOW 20 MIN: CPT | Performed by: FAMILY MEDICINE

## 2024-12-17 RX ORDER — CYCLOBENZAPRINE HCL 10 MG
10 TABLET ORAL DAILY PRN
Qty: 7 TABLET | Refills: 0 | Status: SHIPPED | OUTPATIENT
Start: 2024-12-17

## 2024-12-17 RX ORDER — PREDNISONE 10 MG/1
TABLET ORAL
Qty: 21 TABLET | Refills: 0 | Status: SHIPPED | OUTPATIENT
Start: 2024-12-17

## 2024-12-17 NOTE — TELEPHONE ENCOUNTER
Patient called. States she does not have any messages. Scheduled patient for appt this evening with Dr. Pendleton.

## 2024-12-17 NOTE — PATIENT INSTRUCTIONS
Rest and fluids and start prednisone and rec calling if worse. Previous xrays showed     IMPRESSION:        Moderate multilevel facet disease and spondylosis worse at L5-S1       Rec also muscle relaxant prn. No driving if on muscle relaxant.       Consider PT and orthopedics if not improved. Rest low back and no heavy lifting.

## 2024-12-17 NOTE — ASSESSMENT & PLAN NOTE
Orders:    predniSONE 10 mg tablet; Take 60 mg po day#1, 50 mg po day#2, 40 mg po day#3, 30 mg po day#4, 20 mg po day#5m and 10 mg po day#6    cyclobenzaprine (FLEXERIL) 10 mg tablet; Take 1 tablet (10 mg total) by mouth daily as needed for muscle spasms No driving

## 2024-12-17 NOTE — PROGRESS NOTES
Name: Selene Garrison      : 1975      MRN: [id number]  Encounter Provider: Jonatan Pendleton DO  Encounter Date: 2024   Encounter department: [facility]  :  Chief Complaint   Patient presents with    Back Pain     Right sided Sciatic pain x 4 weeks and no improvement      Patient Instructions   Rest and fluids and start prednisone and rec calling if worse. Previous xrays showed     IMPRESSION:        Moderate multilevel facet disease and spondylosis worse at L5-S1       Rec also muscle relaxant prn. No driving if on muscle relaxant.       Consider PT and orthopedics if not improved. Rest low back and no heavy lifting.     Assessment & Plan  Back pain of lumbar region with sciatica    Orders:    predniSONE 10 mg tablet; Take 60 mg po day#1, 50 mg po day#2, 40 mg po day#3, 30 mg po day#4, 20 mg po day#5m and 10 mg po day#6    cyclobenzaprine (FLEXERIL) 10 mg tablet; Take 1 tablet (10 mg total) by mouth daily as needed for muscle spasms No driving    Sciatica of right side    Orders:    predniSONE 10 mg tablet; Take 60 mg po day#1, 50 mg po day#2, 40 mg po day#3, 30 mg po day#4, 20 mg po day#5m and 10 mg po day#6    cyclobenzaprine (FLEXERIL) 10 mg tablet; Take 1 tablet (10 mg total) by mouth daily as needed for muscle spasms No driving           History of Present Illness     Back Pain (Right sided Sciatic pain x 4 weeks and no improvement ) used ibuprofen for pain. Hx of sciatica.         Previous xrays showed:    IMPRESSION:        Moderate multilevel facet disease and spondylosis worse at L5-S1         Back Pain      Review of Systems   Constitutional: Negative.    HENT: Negative.     Eyes: Negative.    Respiratory: Negative.     Cardiovascular: Negative.    Gastrointestinal: Negative.    Endocrine: Negative.    Genitourinary: Negative.    Musculoskeletal:  Positive for back pain.        Right sciatica   Skin: Negative.    Allergic/Immunologic: Negative.    Neurological:  "Negative.    Hematological: Negative.    Psychiatric/Behavioral: Negative.         Objective   /80   Pulse 64   Temp 97.7 °F (36.5 °C) (Temporal)   Ht 5' 7.5\" (1.715 m)   Wt 71.2 kg (157 lb)   LMP 01/15/2020   SpO2 99%   BMI 24.23 kg/m²      Physical Exam  Constitutional:       Appearance: She is well-developed.   HENT:      Head: Normocephalic and atraumatic.      Right Ear: External ear normal.      Left Ear: External ear normal.      Nose: Nose normal.   Eyes:      Conjunctiva/sclera: Conjunctivae normal.      Pupils: Pupils are equal, round, and reactive to light.   Cardiovascular:      Rate and Rhythm: Normal rate and regular rhythm.      Heart sounds: Normal heart sounds.   Pulmonary:      Effort: Pulmonary effort is normal.      Breath sounds: Normal breath sounds.   Musculoskeletal:      Cervical back: Normal range of motion and neck supple.      Comments: Right sciatica   Skin:     General: Skin is warm and dry.   Neurological:      General: No focal deficit present.      Mental Status: She is alert and oriented to person, place, and time. Mental status is at baseline.      Deep Tendon Reflexes: Reflexes are normal and symmetric.   Psychiatric:         Mood and Affect: Mood normal.         Behavior: Behavior normal.         Thought Content: Thought content normal.         Judgment: Judgment normal.       Administrative Statements   I have spent a total time of 23 minutes in caring for this patient on the day of the visit/encounter including Diagnostic results, Prognosis, Risks and benefits of tx options, Instructions for management, Patient and family education, Importance of tx compliance, Risk factor reductions, Impressions, Counseling / Coordination of care, Documenting in the medical record, Reviewing / ordering tests, medicine, procedures  , and Obtaining or reviewing history  .   "